# Patient Record
Sex: MALE | Race: WHITE | NOT HISPANIC OR LATINO | Employment: UNEMPLOYED | URBAN - METROPOLITAN AREA
[De-identification: names, ages, dates, MRNs, and addresses within clinical notes are randomized per-mention and may not be internally consistent; named-entity substitution may affect disease eponyms.]

---

## 2017-10-16 ENCOUNTER — TELEPHONE (OUTPATIENT)
Dept: ADMINISTRATIVE | Facility: HOSPITAL | Age: 52
End: 2017-10-16

## 2018-04-24 ENCOUNTER — TELEPHONE (OUTPATIENT)
Dept: ADMINISTRATIVE | Facility: HOSPITAL | Age: 53
End: 2018-04-24

## 2019-02-08 PROBLEM — K57.32 DIVERTICULITIS OF LARGE INTESTINE WITHOUT BLEEDING: Status: ACTIVE | Noted: 2019-02-08

## 2019-02-08 PROBLEM — K57.20 DIVERTICULITIS OF LARGE INTESTINE WITH PERFORATION: Status: ACTIVE | Noted: 2019-02-08

## 2019-02-08 PROBLEM — R10.9 ABDOMINAL PAIN: Status: ACTIVE | Noted: 2019-02-08

## 2019-02-14 ENCOUNTER — PATIENT OUTREACH (OUTPATIENT)
Dept: ADMINISTRATIVE | Facility: CLINIC | Age: 54
End: 2019-02-14

## 2019-02-14 NOTE — PATIENT INSTRUCTIONS
Discharge Instructions for Colostomy  You just underwent a procedure that required a colostomy. This is a life-saving procedure that involves removing or disconnecting part of your colon (large intestine). If your large intestine was diseased, your healthcare provider may have removed it. If it was injured, your healthcare provider may have disconnected it for a short time so that it can heal. After it heals, your healthcare provider may reconnect it. During a colostomy formation, your healthcare provider reroutes your colon through your abdominal wall. Stool and mucus can then pass out of your body through this opening, called a stoma. The following are general guidelines for home care following a colostomy. Your healthcare provider will go over any information that is specific to your condition.  Home care  Suggestions for home care include the following:  · Take care of your stoma as directed. Your healthcare provider and ostomy nurse discussed how to do this with you before you left the hospital.  · Ask your healthcare provider or ostomy nurse for a patient education sheet about colostomy care before you leave the hospital. This will help remind you how to care for yourself.  · If a partner or significant other will be helping you recover, ask the medical team to educate that person on ostomy care as well.   · Dont lift anything heavier than 5 pounds until your healthcare provider says it is OK.  · Dont drive until after your first healthcare providers appointment after your surgery.  · If you ride in a car for more than short trips, stop often to stretch your legs.  · Ask your healthcare provider when you can expect to return to work. Most patients are able to return to work within 4 to 6 weeks after surgery.  · Increase your activity gradually. Take short walks on a level surface.  · Wash your incision site with soap and water and pat it dry.  · Check your incision every day for redness, drainage, swelling,  or separation of the skin.  · Take your medicines exactly as directed. Dont skip doses.  · Dont take any over-the-counter medicine unless your healthcare provider tells you to do so.  Call your healthcare provider  Call your healthcare provider immediately if you have any of the following:  · Excessive bleeding from your stoma  · Blood in your stool  · Stool that is very hard  · No gas or stool  · Change in the color of your stoma  · Bulging skin around your stoma  · A stoma that looks like its getting longer  · Fever of 100.4°F (38°C) or higher, or chills, or as advised by your healthcare provider   · Redness, swelling, bleeding, or drainage from your incision  · Constipation  · Diarrhea  · Nausea or vomiting  · Increased pain in the belly or around the stoma   Date Last Reviewed: 7/1/2016 © 2000-2019[employer]. [address] [place] [address]. All rights reserved. This information is not intended as a substitute for professional medical care. Always follow your healthcare professional's instructions.  Bacteremia, Suspected (Adult)  Your fever today is probably due to a viral illness. However, sometimes fever can be an early sign of a more serious bacterial infection. Bacteremia is a bacterial infection that has spread to the bloodstream. This is serious because it can spread to other organs, including the kidneys, brain, and lungs.  Your healthcare provider will perform tests (cultures) to check for bacteremia. Until the test results are known you should watch for the signs listed below.  Causes  Bacteremia usually starts with a typical infection, but it then spreads to the blood. Almost any type of infection can cause bacteremia, including a urinary tract infection, skin infection, gastrointestinal problem, surgical complication, or pneumonia.  Symptoms  At first symptoms may seem like any typical infection or illness, but then they worsen. Symptoms of bacteremia can include:  · Fever  and chills  · Loss of appetite  · Nausea or vomiting  · Trouble breathing or fast breathing  · Fast heart rate  · Feeling lightheaded or faint  · Skin rashes or blotches  Home care  Follow these guidelines when caring for yourself at home.  · Rest at home for the first 2 to 3 days. When resuming activity, don't let yourself become overly tired.  · You can take acetaminophen or ibuprofen for pain, unless you were given a different pain medicine to use. (Note: If you have chronic liver or kidney disease or have ever had a stomach ulcer or gastrointestinal bleeding, talk with your healthcare provider before using these medicines. Also talk to your provider if you are taking medicine to prevent blood clots.) Aspirin should never be given to anyone younger than 18 years of age who is ill with a viral infection or fever. It may cause severe liver or brain damage.  · If you were given antibiotics, take them until they are used up, or your healthcare provider tells you to stop. It is important to finish the antibiotics even though you feel better. This is to make sure the infection has cleared.  · Your appetite may be poor, so a light diet is fine. Avoid dehydration by drinking 6 to 8 glasses of fluid per day (such as water, soft drinks, sports drinks, juices, tea, or soup).  Follow-up care  Follow up with your healthcare provider, or as advised.  · If a culture was done, you will be notified if your treatment needs to be changed. You can call as directed for the results.  · If X-rays, a CT, or an ultrasound were done, a specialist will review them. You will be notified of any findings that may affect your care.  Call 911  Contact emergency services right away if any of these occur:  · Trouble breathing or swallowing, or wheezing  · Chest pain  · Confusion or sudden change in behavior  · Extreme drowsiness or trouble awakening  · Fainting or loss of consciousness  · Rapid heart rate  · Low blood pressure  · Vomiting blood,  or large amounts of blood in stool  · Seizure  When to seek medical advice  Call your healthcare provider right away if any of these occur:  · Cough with lots of colored sputum (mucus), or blood in your sputum  · Severe headache  · Severe face, neck, throat, or ear pain  · Pain in the right lower abdomen  · Weakness, dizziness, repeated vomiting, or diarrhea  · Joint pain or a new rash  · Burning when urinating  · Fever of 100.4°F (38°C) or higher  Date Last Reviewed: 7/30/2015  © 4455-8832 [employer]. [address] [place] [address]. All rights reserved. This information is not intended as a substitute for professional medical care. Always follow your healthcare professional's instructions.

## 2019-02-18 ENCOUNTER — NURSE TRIAGE (OUTPATIENT)
Dept: ADMINISTRATIVE | Facility: CLINIC | Age: 54
End: 2019-02-18

## 2019-02-18 NOTE — TELEPHONE ENCOUNTER
Reason for Disposition   [1] Pus or bad-smelling fluid draining from incision AND [2] no fever    Protocols used: ST POST-OP INCISION SYMPTOMS-A-AH    Cristóbal called to say the stoma created w/ exp lap 02/08/19 is leaking pus, thick yellow, blood-tinged mucus, which is new. He began noticing some blood from inside the stoma when bag changed on Saturday, but today the infectious-looking drainage is new.  No fever. Some redness around the stoma. No new or severe abdominal pain.  Per [facility] triage protocol, recommended he be seen within 4 hours, and assured him will message Dr Bogran-Reyes with this information and request a call back as soon as possible.  He states his wife will bring him to [facility] now, and wait for the call from Dr Reyes. Message also to Shelli Price MD, and aquiles Perry. Please contact caller directly with any additional care advice at [phone], as they will have this phone with them.

## 2019-02-21 PROBLEM — Z93.3 COLOSTOMY PRESENT: Status: ACTIVE | Noted: 2019-02-21

## 2019-03-25 PROBLEM — K57.92 DIVERTICULITIS: Status: ACTIVE | Noted: 2019-03-25

## 2019-04-10 PROBLEM — Z93.3 STATUS POST HARTMANN PROCEDURE: Status: ACTIVE | Noted: 2019-04-10

## 2019-04-30 PROBLEM — K57.32 DIVERTICULITIS OF LARGE INTESTINE WITHOUT BLEEDING: Status: RESOLVED | Noted: 2019-02-08 | Resolved: 2019-04-30

## 2019-04-30 PROBLEM — R10.9 ABDOMINAL PAIN: Status: RESOLVED | Noted: 2019-02-08 | Resolved: 2019-04-30

## 2019-04-30 PROBLEM — Z93.3 STATUS POST HARTMANN'S PROCEDURE: Status: ACTIVE | Noted: 2019-04-30

## 2019-04-30 PROBLEM — K57.92 DIVERTICULITIS: Status: RESOLVED | Noted: 2019-03-25 | Resolved: 2019-04-30

## 2019-04-30 PROBLEM — Z93.3 COLOSTOMY IN PLACE: Status: ACTIVE | Noted: 2019-04-30

## 2019-04-30 PROBLEM — K57.20 DIVERTICULITIS OF LARGE INTESTINE WITH PERFORATION: Status: RESOLVED | Noted: 2019-02-08 | Resolved: 2019-04-30

## 2019-05-01 PROBLEM — Z98.890 HISTORY OF COLOSTOMY REVERSAL: Status: ACTIVE | Noted: 2019-04-30

## 2019-05-13 ENCOUNTER — CLINICAL SUPPORT (OUTPATIENT)
Dept: SMOKING CESSATION | Facility: CLINIC | Age: 54
End: 2019-05-13
Payer: COMMERCIAL

## 2019-05-13 DIAGNOSIS — F17.210 HEAVY CIGARETTE SMOKER (20-39 PER DAY): Primary | ICD-10-CM

## 2019-05-13 PROCEDURE — 99404 PR PREVENT COUNSEL,INDIV,60 MIN: ICD-10-PCS | Mod: S$GLB,,,

## 2019-05-13 PROCEDURE — 99404 PREV MED CNSL INDIV APPRX 60: CPT | Mod: S$GLB,,,

## 2019-05-13 RX ORDER — IBUPROFEN 200 MG
1 TABLET ORAL DAILY
Qty: 14 PATCH | Refills: 0 | Status: SHIPPED | OUTPATIENT
Start: 2019-05-13 | End: 2019-05-23 | Stop reason: SDUPTHER

## 2019-05-13 NOTE — PROGRESS NOTES
Patient currently tobacco free since 4/30/19, smoking 1-1.5 packs per day prior.  Pt will begin weekly tobacco cessation group meetings. Patient will continue previously prescribed tobacco cessation medication regimen of 21mg nicotine patch, increasing from 14mg, daily as directed.

## 2019-05-16 ENCOUNTER — CLINICAL SUPPORT (OUTPATIENT)
Dept: SMOKING CESSATION | Facility: CLINIC | Age: 54
End: 2019-05-16
Payer: COMMERCIAL

## 2019-05-16 DIAGNOSIS — F17.200 NICOTINE DEPENDENCE: Primary | ICD-10-CM

## 2019-05-16 PROCEDURE — 90853 GROUP PSYCHOTHERAPY: CPT | Mod: S$GLB,,,

## 2019-05-16 PROCEDURE — 90853 PR GROUP PSYCHOTHERAPY: ICD-10-PCS | Mod: S$GLB,,,

## 2019-05-16 NOTE — PROGRESS NOTES
Smoking Cessation Group Session #2    Site: [facility]  Date:  5/16/2019  Clinical Status of Patient: Outpatient   Length of Service and Code: 90 minutes - 55473   Number in Attendance: 5  Group Activities/Focus of Group:  completion of TCRS (Tobacco Cessation Rating Scale) reviewed strategies, cues, and triggers. Introduced the negative impact of tobacco on health, the health advantages of discontinuing the use of tobacco, time line improved health changes after a quit, withdrawal issues to expect from nicotine and habit, and ways to achieve the goal of a quit.    Target symptoms:  withdrawal and medication side effects             The following were rated moderate (3) to severe (4) on TCRS:       Moderate 3: none     Severe 4:   none  Patient's Response to Intervention: 0ppm; .5 cig on only 1 day; Patient remains on prescribed tobacco cessation medication regimen of 21 mg patch without any negative side effects at this time; desire to quit = 8, confidence = 8      Progress Toward Goals and Other Mental Status Changes: The patient denies any abnormal behavioral or mental changes at this time.     Diagnosis: F17.210    Plan: The patient will continue with group therapy sessions and medication monitoring by CTTS. Prescribed medication management will be by physician.   Return to Clinic: 1 week    Quit Date:    Planned Quit Date:

## 2019-05-16 NOTE — Clinical Note
0ppm; .5 cig on only 1 day; Patient remains on prescribed tobacco cessation medication regimen of 21 mg patch without any negative side effects at this time; desire to quit = 8, confidence = 8

## 2019-05-23 ENCOUNTER — CLINICAL SUPPORT (OUTPATIENT)
Dept: SMOKING CESSATION | Facility: CLINIC | Age: 54
End: 2019-05-23
Payer: COMMERCIAL

## 2019-05-23 DIAGNOSIS — F17.210 HEAVY CIGARETTE SMOKER (20-39 PER DAY): ICD-10-CM

## 2019-05-23 DIAGNOSIS — F17.200 NICOTINE DEPENDENCE: Primary | ICD-10-CM

## 2019-05-23 PROCEDURE — 90853 GROUP PSYCHOTHERAPY: CPT | Mod: S$GLB,,,

## 2019-05-23 PROCEDURE — 90853 PR GROUP PSYCHOTHERAPY: ICD-10-PCS | Mod: S$GLB,,,

## 2019-05-23 RX ORDER — IBUPROFEN 200 MG
1 TABLET ORAL DAILY
Qty: 14 PATCH | Refills: 0 | Status: SHIPPED | OUTPATIENT
Start: 2019-05-23 | End: 2019-06-06 | Stop reason: DRUGHIGH

## 2019-05-23 NOTE — Clinical Note
0ppm; 0 cig/day since 5/16/19; Patient remains on prescribed tobacco cessation medication regimen of 21 mg patch without any negative side effects at this time; desire to quit = 10, confidence = 8

## 2019-05-23 NOTE — PROGRESS NOTES
Smoking Cessation Group Session #3    Site: [facility]  Date:  5/23/2019  Clinical Status of Patient: Outpatient   Length of Service and Code: 90 minutes - 13690   Number in Attendance: 4  Group Activities/Focus of Group:  completion of TCRS (Tobacco Cessation Rating Scale) reviewed strategies, controlling environment, cues, triggers, new goals set. Introduced high risk situations with preparation interventions, caffeine similarities with withdrawal issues of habit and nicotine, Alcohol, Understanding urges, cravings, stress and relaxation. Open discussion with intervention discussion.    Target symptoms:  withdrawal and medication side effects             The following were rated moderate (3) to severe (4) on TCRS:       Moderate 3: none     Severe 4:   none  Patient's Response to Intervention: 0ppm; 0 cig/day since 5/16/19; Patient remains on prescribed tobacco cessation medication regimen of 21 mg patch without any negative side effects at this time; desire to quit = 10, confidence = 8      Progress Toward Goals and Other Mental Status Changes: The patient denies any abnormal behavioral or mental changes at this time.     Diagnosis: F17.210    Plan: The patient will continue with group therapy sessions and medication monitoring by CTTS. Prescribed medication management will be by physician.   Return to Clinic: 1 week    Quit Date: 5/16/19   Planned Quit Date: 5/16/19

## 2019-05-30 ENCOUNTER — CLINICAL SUPPORT (OUTPATIENT)
Dept: SMOKING CESSATION | Facility: CLINIC | Age: 54
End: 2019-05-30
Payer: COMMERCIAL

## 2019-05-30 DIAGNOSIS — F17.200 NICOTINE DEPENDENCE: Primary | ICD-10-CM

## 2019-05-30 PROCEDURE — 90853 PR GROUP PSYCHOTHERAPY: ICD-10-PCS | Mod: S$GLB,,,

## 2019-05-30 PROCEDURE — 90853 GROUP PSYCHOTHERAPY: CPT | Mod: S$GLB,,,

## 2019-05-30 NOTE — Clinical Note
2ppm; 0 cig/day since 5/16/19; Patient remains on prescribed tobacco cessation medication regimen of 21 mg patch without any negative side effects at this time; desire to quit = 9, confidence = 9

## 2019-05-30 NOTE — PROGRESS NOTES
Smoking Cessation Group Session #4    Site: [facility]  Date:  5/30/2019  Clinical Status of Patient: Outpatient   Length of Service and Code: 90 minutes - 42714   Number in Attendance: 5  Group Activities/Focus of Group:  completion of TCRS (Tobacco Cessation Rating Scale) reviewed strategies, habitual behavior, stress, and high risk situations. Introduced stress with addition interventions, SOLVE, relaxation with interventions, nutrition, exercise, weight gain, and the importance of rewarding oneself for accomplishments toward becoming tobacco free. Open discussion of all items with interventions.     Target symptoms:  withdrawal and medication side effects             The following were rated moderate (3) to severe (4) on TCRS:       Moderate 3: none     Severe 4:   none  Patient's Response to Intervention: 2ppm; 0 cig/day since 5/16/19; Patient remains on prescribed tobacco cessation medication regimen of 21 mg patch without any negative side effects at this time; desire to quit = 9, confidence = 9      Progress Toward Goals and Other Mental Status Changes: The patient denies any abnormal behavioral or mental changes at this time.     Diagnosis: F17.210  Plan: The patient will continue with group therapy sessions and medication monitoring by CTTS. Prescribed medication management will be by physician.   Return to Clinic: 1 week    Quit Date: 5/16/19   Planned Quit Date: 5/16/19

## 2019-06-03 PROBLEM — S30.1XXA ABDOMINAL WALL SEROMA: Status: ACTIVE | Noted: 2019-06-03

## 2019-06-06 ENCOUNTER — CLINICAL SUPPORT (OUTPATIENT)
Dept: SMOKING CESSATION | Facility: CLINIC | Age: 54
End: 2019-06-06
Payer: COMMERCIAL

## 2019-06-06 DIAGNOSIS — F17.200 NICOTINE DEPENDENCE: Primary | ICD-10-CM

## 2019-06-06 PROCEDURE — 99406 PR TOBACCO USE CESSATION INTERMEDIATE 3-10 MINUTES: ICD-10-PCS | Mod: S$GLB,,,

## 2019-06-06 PROCEDURE — 99406 BEHAV CHNG SMOKING 3-10 MIN: CPT | Mod: S$GLB,,,

## 2019-06-06 RX ORDER — IBUPROFEN 200 MG
1 TABLET ORAL DAILY
Qty: 14 PATCH | Refills: 0 | Status: SHIPPED | OUTPATIENT
Start: 2019-06-06 | End: 2019-06-13 | Stop reason: SDUPTHER

## 2019-06-06 NOTE — PROGRESS NOTES
pt reports smoking 2 cigarettes this past week after learning the need for another surgery and feeling very discouraged and stressed; pt is doing well on nicotine patches and will continue 14mg nicotine patch, reducing from 21mg, without negative side effects at this time; discussed with pt upcoming surgery and next steps in quittng process

## 2019-06-13 ENCOUNTER — CLINICAL SUPPORT (OUTPATIENT)
Dept: SMOKING CESSATION | Facility: CLINIC | Age: 54
End: 2019-06-13
Payer: COMMERCIAL

## 2019-06-13 DIAGNOSIS — F17.200 NICOTINE DEPENDENCE: Primary | ICD-10-CM

## 2019-06-13 PROCEDURE — 90853 PR GROUP PSYCHOTHERAPY: ICD-10-PCS | Mod: S$GLB,,,

## 2019-06-13 PROCEDURE — 90853 GROUP PSYCHOTHERAPY: CPT | Mod: S$GLB,,,

## 2019-06-13 RX ORDER — IBUPROFEN 200 MG
1 TABLET ORAL DAILY
Qty: 14 PATCH | Refills: 0 | Status: SHIPPED | OUTPATIENT
Start: 2019-06-13 | End: 2019-06-26 | Stop reason: SDUPTHER

## 2019-06-13 NOTE — Clinical Note
0ppm; 3 cig total in past week; Patient remains on prescribed tobacco cessation medication regimen of 14mg patch without any negative side effects at this time; desire to quit = 9, confidence = 9

## 2019-06-13 NOTE — PROGRESS NOTES
Smoking Cessation Group Session #6    Site: [facility]  Date:  6/13/2019  Clinical Status of Patient: Outpatient   Length of Service and Code: 90 minutes - 66243   Number in Attendance: 3  Group Activities/Focus of Group:  completion of TCRS (Tobacco Cessation Rating Scale) reviewed strategies, cues, triggers, high risk situations, lapses, relapses, diet, exercise, stress, relaxation, sleep, habitual behavior, and life style changes.    Target symptoms:  withdrawal and medication side effects             The following were rated moderate (3) to severe (4) on TCRS:       Moderate 3: none     Severe 4:   none  Patient's Response to Intervention: 0ppm; 3 cig total in past week; Patient remains on prescribed tobacco cessation medication regimen of 14mg patch without any negative side effects at this time; desire to quit = 9, confidence = 9      Progress Toward Goals and Other Mental Status Changes: The patient denies any abnormal behavioral or mental changes at this time.     Diagnosis: F17.210    Plan: The patient will continue with group therapy sessions and medication monitoring by CTTS. Prescribed medication management will be by physician.   Return to Clinic: 2 weeks    Quit Date:    Planned Quit Date:

## 2019-06-26 ENCOUNTER — CLINICAL SUPPORT (OUTPATIENT)
Dept: SMOKING CESSATION | Facility: CLINIC | Age: 54
End: 2019-06-26
Payer: COMMERCIAL

## 2019-06-26 DIAGNOSIS — F17.200 NICOTINE DEPENDENCE: Primary | ICD-10-CM

## 2019-06-26 PROCEDURE — 99402 PREV MED CNSL INDIV APPRX 30: CPT | Mod: S$GLB,,,

## 2019-06-26 PROCEDURE — 99402 PR PREVENT COUNSEL,INDIV,30 MIN: ICD-10-PCS | Mod: S$GLB,,,

## 2019-06-26 RX ORDER — IBUPROFEN 200 MG
1 TABLET ORAL DAILY
Qty: 14 PATCH | Refills: 0 | Status: SHIPPED | OUTPATIENT
Start: 2019-06-26 | End: 2019-07-10 | Stop reason: DRUGHIGH

## 2019-06-26 NOTE — Clinical Note
1ppm; 0 cig/day since3 6/15/19; The patient remains on the prescribed tobacco cessation medication regimen of 7mg patch without any negative side effects at this time; discussed next steps in quit plan; desire to quit = 9, confidence = 9

## 2019-06-26 NOTE — PROGRESS NOTES
Individual Follow-Up Form    6/26/2019    Quit Date: 6/15/19    Clinical Status of Patient: Outpatient    Length of Service: 30 minutes    Continuing Medication: yes  Patches    Other Medications:      Target Symptoms: Withdrawal and medication side effects. The following were  rated moderate (3) to severe (4) on TCRS:  · Moderate (3): none  · Severe (4): none    Comments: 1ppm; 0 cig/day since3 6/15/19; The patient remains on the prescribed tobacco cessation medication regimen of 7mg patch without any negative side effects at this time; discussed next steps in quit plan; desire to quit = 9, confidence = 9         Diagnosis: F17.200    Next Visit: 2 weeks

## 2019-07-10 ENCOUNTER — CLINICAL SUPPORT (OUTPATIENT)
Dept: SMOKING CESSATION | Facility: CLINIC | Age: 54
End: 2019-07-10
Payer: COMMERCIAL

## 2019-07-10 DIAGNOSIS — F17.200 NICOTINE DEPENDENCE: Primary | ICD-10-CM

## 2019-07-10 PROCEDURE — 99402 PREV MED CNSL INDIV APPRX 30: CPT | Mod: S$GLB,,,

## 2019-07-10 PROCEDURE — 99402 PR PREVENT COUNSEL,INDIV,30 MIN: ICD-10-PCS | Mod: S$GLB,,,

## 2019-07-10 RX ORDER — NICOTINE 7MG/24HR
1 PATCH, TRANSDERMAL 24 HOURS TRANSDERMAL DAILY
Qty: 14 PATCH | Refills: 0 | Status: SHIPPED | OUTPATIENT
Start: 2019-07-10 | End: 2019-07-24 | Stop reason: SDUPTHER

## 2019-07-10 NOTE — Clinical Note
4ppm; 0 cig/day since 6/15/19; The patient remains on the prescribed tobacco cessation medication regimen of 7mg patch, reducing from 14mg. without any negative side effects at this time

## 2019-07-10 NOTE — PROGRESS NOTES
Individual Follow-Up Form    7/10/2019    Quit Date: 6/15/19    Clinical Status of Patient: Outpatient    Length of Service: 30 minutes    Continuing Medication: yes  Patches    Other Medications:      Target Symptoms: Withdrawal and medication side effects. The following were  rated moderate (3) to severe (4) on TCRS:  · Moderate (3): none  · Severe (4): none    Comments: 4ppm; 0 cig/day since 6/15/19; The patient remains on the prescribed tobacco cessation medication regimen of 7mg patch, reducing from 14mg. without any negative side effects at this time       Diagnosis: F17.200    Next Visit: 2 weeks

## 2019-07-24 ENCOUNTER — TELEPHONE (OUTPATIENT)
Dept: SMOKING CESSATION | Facility: CLINIC | Age: 54
End: 2019-07-24

## 2019-07-24 DIAGNOSIS — F17.200 NICOTINE DEPENDENCE: ICD-10-CM

## 2019-07-24 RX ORDER — NICOTINE 7MG/24HR
1 PATCH, TRANSDERMAL 24 HOURS TRANSDERMAL DAILY
Qty: 14 PATCH | Refills: 0 | Status: SHIPPED | OUTPATIENT
Start: 2019-07-24 | End: 2019-08-14 | Stop reason: SDUPTHER

## 2019-08-14 ENCOUNTER — CLINICAL SUPPORT (OUTPATIENT)
Dept: SMOKING CESSATION | Facility: CLINIC | Age: 54
End: 2019-08-14
Payer: COMMERCIAL

## 2019-08-14 DIAGNOSIS — F17.200 NICOTINE DEPENDENCE: Primary | ICD-10-CM

## 2019-08-14 PROCEDURE — 99402 PR PREVENT COUNSEL,INDIV,30 MIN: ICD-10-PCS | Mod: S$GLB,,,

## 2019-08-14 PROCEDURE — 99402 PREV MED CNSL INDIV APPRX 30: CPT | Mod: S$GLB,,,

## 2019-08-14 RX ORDER — NICOTINE 7MG/24HR
1 PATCH, TRANSDERMAL 24 HOURS TRANSDERMAL DAILY
Qty: 28 PATCH | Refills: 0 | Status: SHIPPED | OUTPATIENT
Start: 2019-08-14 | End: 2021-06-07

## 2019-08-14 NOTE — PROGRESS NOTES
Individual Follow-Up Form    8/14/2019    Quit Date: 6/15/19    Clinical Status of Patient: Outpatient    Length of Service: 30 minutes    Continuing Medication: yes  Patches    Other Medications:      Target Symptoms: Withdrawal and medication side effects. The following were  rated moderate (3) to severe (4) on TCRS:  · Moderate (3): none  · Severe (4): none    Comments: 0 cig/day since 6/15/19; The patient remains on the prescribed tobacco cessation medication regimen of 7mg patch, without any negative side effects at this time; discussed with pt next steps in quit plan    Diagnosis: F17.200    Next Visit: 9/11/19

## 2019-08-14 NOTE — Clinical Note
0 cig/day since 6/15/19; The patient remains on the prescribed tobacco cessation medication regimen of 7mg patch, without any negative side effects at this time; discussed with pt next steps in quit plan

## 2019-09-11 ENCOUNTER — CLINICAL SUPPORT (OUTPATIENT)
Dept: SMOKING CESSATION | Facility: CLINIC | Age: 54
End: 2019-09-11
Payer: COMMERCIAL

## 2019-09-11 DIAGNOSIS — F17.200 NICOTINE DEPENDENCE: Primary | ICD-10-CM

## 2019-09-11 PROCEDURE — 99402 PREV MED CNSL INDIV APPRX 30: CPT | Mod: S$GLB,,,

## 2019-09-11 PROCEDURE — 99402 PR PREVENT COUNSEL,INDIV,30 MIN: ICD-10-PCS | Mod: S$GLB,,,

## 2019-09-11 NOTE — PROGRESS NOTES
Individual Follow-Up Form    9/11/2019    Quit Date: 6/15/19    Clinical Status of Patient: Outpatient    Length of Service: 30 minutes    Continuing Medication: no    Other Medications:      Target Symptoms: Withdrawal and medication side effects. The following were  rated moderate (3) to severe (4) on TCRS:  · Moderate (3): none  · Severe (4): none    Comments: 0 cig/day since 6/15/19; pt discontinued nicotine patch on his own about a week ago and reports doing well, does not want to resume patches at this time; congratulated pt on quit status, discharged from clinic, encouraged to contact clinic if cravings/desire increase    Diagnosis: F17.200    Next Visit: discharged from clinic

## 2019-09-11 NOTE — Clinical Note
0 cig/day since 6/15/19; pt discontinued nicotine patch on his own about a week ago and reports doing well, does not want to resume patches at this time; congratulated pt on quit status, discharged from clinic, encouraged to contact clinic if cravings/desire increase

## 2019-11-15 ENCOUNTER — CLINICAL SUPPORT (OUTPATIENT)
Dept: SMOKING CESSATION | Facility: CLINIC | Age: 54
End: 2019-11-15
Payer: COMMERCIAL

## 2019-11-15 DIAGNOSIS — F17.200 NICOTINE DEPENDENCE: Primary | ICD-10-CM

## 2019-11-15 PROCEDURE — 99407 PR TOBACCO USE CESSATION INTENSIVE >10 MINUTES: ICD-10-PCS | Mod: S$GLB,,,

## 2019-11-15 PROCEDURE — 99407 BEHAV CHNG SMOKING > 10 MIN: CPT | Mod: S$GLB,,,

## 2019-11-15 NOTE — PROGRESS NOTES
Successful contact with patient regarding tobacco cessation quit #1. Pt states, he remain tobacco free, he no longer experience cravings, and no further assistance is needed at this time. Pt commended for the accomplishment thus far. Pt informed of his benefit status, future telephone follow ups, and contact information to schedule an appointment if he need support. Will update the tobacco cessation smart form for 3-6 months on quit #1.

## 2020-05-13 ENCOUNTER — CLINICAL SUPPORT (OUTPATIENT)
Dept: SMOKING CESSATION | Facility: CLINIC | Age: 55
End: 2020-05-13
Payer: COMMERCIAL

## 2020-05-13 DIAGNOSIS — F17.200 NICOTINE DEPENDENCE: Primary | ICD-10-CM

## 2020-05-13 PROCEDURE — 99407 BEHAV CHNG SMOKING > 10 MIN: CPT | Mod: S$GLB,,,

## 2020-05-13 PROCEDURE — 99407 PR TOBACCO USE CESSATION INTENSIVE >10 MINUTES: ICD-10-PCS | Mod: S$GLB,,,

## 2020-05-13 NOTE — PROGRESS NOTES
Called pt to f/u on his 12 month smoking cessation quit status. Pt stated he remains tobacco free. Congratulated him on his hard work and success. Informed him of benefit period, phone follow ups, and contact information. Will complete smart form and resolve quit #1 episode.

## 2021-05-04 ENCOUNTER — PATIENT MESSAGE (OUTPATIENT)
Dept: RESEARCH | Facility: HOSPITAL | Age: 56
End: 2021-05-04

## 2021-06-24 PROBLEM — R09.02 HYPOXIA: Status: ACTIVE | Noted: 2021-06-24

## 2021-06-24 PROBLEM — S46.012A TRAUMATIC COMPLETE TEAR OF LEFT ROTATOR CUFF: Status: ACTIVE | Noted: 2021-06-24

## 2021-08-26 ENCOUNTER — LAB VISIT (OUTPATIENT)
Dept: PRIMARY CARE CLINIC | Facility: OTHER | Age: 56
End: 2021-08-26
Attending: INTERNAL MEDICINE
Payer: MEDICAID

## 2021-08-26 DIAGNOSIS — U07.1 COVID-19: Primary | ICD-10-CM

## 2021-08-26 PROCEDURE — U0003 INFECTIOUS AGENT DETECTION BY NUCLEIC ACID (DNA OR RNA); SEVERE ACUTE RESPIRATORY SYNDROME CORONAVIRUS 2 (SARS-COV-2) (CORONAVIRUS DISEASE [COVID-19]), AMPLIFIED PROBE TECHNIQUE, MAKING USE OF HIGH THROUGHPUT TECHNOLOGIES AS DESCRIBED BY CMS-2020-01-R: HCPCS | Performed by: INTERNAL MEDICINE

## 2021-08-28 LAB
SARS-COV-2 RNA RESP QL NAA+PROBE: NOT DETECTED
SARS-COV-2- CYCLE NUMBER: NORMAL

## 2021-09-08 ENCOUNTER — CLINICAL SUPPORT (OUTPATIENT)
Dept: REHABILITATION | Facility: HOSPITAL | Age: 56
End: 2021-09-08
Payer: MEDICAID

## 2021-09-08 DIAGNOSIS — S46.012A TRAUMATIC COMPLETE TEAR OF LEFT ROTATOR CUFF, INITIAL ENCOUNTER: Primary | ICD-10-CM

## 2021-09-08 PROCEDURE — 97165 OT EVAL LOW COMPLEX 30 MIN: CPT

## 2021-09-14 ENCOUNTER — TELEPHONE (OUTPATIENT)
Dept: REHABILITATION | Facility: HOSPITAL | Age: 56
End: 2021-09-14

## 2021-09-22 ENCOUNTER — CLINICAL SUPPORT (OUTPATIENT)
Dept: REHABILITATION | Facility: HOSPITAL | Age: 56
End: 2021-09-22
Payer: MEDICAID

## 2021-09-22 DIAGNOSIS — S46.012A TRAUMATIC COMPLETE TEAR OF LEFT ROTATOR CUFF, INITIAL ENCOUNTER: Primary | ICD-10-CM

## 2021-09-22 PROCEDURE — 97530 THERAPEUTIC ACTIVITIES: CPT

## 2021-09-24 ENCOUNTER — CLINICAL SUPPORT (OUTPATIENT)
Dept: REHABILITATION | Facility: HOSPITAL | Age: 56
End: 2021-09-24
Payer: MEDICAID

## 2021-09-24 DIAGNOSIS — S46.012A TRAUMATIC COMPLETE TEAR OF LEFT ROTATOR CUFF, INITIAL ENCOUNTER: Primary | ICD-10-CM

## 2021-09-24 PROCEDURE — 97530 THERAPEUTIC ACTIVITIES: CPT

## 2021-10-08 ENCOUNTER — CLINICAL SUPPORT (OUTPATIENT)
Dept: REHABILITATION | Facility: HOSPITAL | Age: 56
End: 2021-10-08
Payer: MEDICAID

## 2021-10-08 DIAGNOSIS — S46.012A TRAUMATIC COMPLETE TEAR OF LEFT ROTATOR CUFF, INITIAL ENCOUNTER: Primary | ICD-10-CM

## 2021-10-08 PROCEDURE — 97530 THERAPEUTIC ACTIVITIES: CPT

## 2021-11-05 ENCOUNTER — DOCUMENTATION ONLY (OUTPATIENT)
Dept: REHABILITATION | Facility: HOSPITAL | Age: 56
End: 2021-11-05
Payer: MEDICAID

## 2021-11-18 ENCOUNTER — PATIENT MESSAGE (OUTPATIENT)
Dept: ADMINISTRATIVE | Facility: OTHER | Age: 56
End: 2021-11-18
Payer: MEDICAID

## 2022-01-08 ENCOUNTER — OFFICE VISIT (OUTPATIENT)
Dept: URGENT CARE | Facility: CLINIC | Age: 57
End: 2022-01-08
Payer: MEDICAID

## 2022-01-08 VITALS
HEART RATE: 87 BPM | TEMPERATURE: 98 F | BODY MASS INDEX: 31.39 KG/M2 | DIASTOLIC BLOOD PRESSURE: 84 MMHG | RESPIRATION RATE: 16 BRPM | SYSTOLIC BLOOD PRESSURE: 132 MMHG | HEIGHT: 71 IN | WEIGHT: 224.19 LBS | OXYGEN SATURATION: 95 %

## 2022-01-08 DIAGNOSIS — R05.9 COUGH: ICD-10-CM

## 2022-01-08 DIAGNOSIS — Z11.59 SCREENING FOR VIRAL DISEASE: ICD-10-CM

## 2022-01-08 DIAGNOSIS — Z76.0 MEDICATION REFILL: ICD-10-CM

## 2022-01-08 DIAGNOSIS — J06.9 ACUTE URI: Primary | ICD-10-CM

## 2022-01-08 LAB
CTP QC/QA: YES
SARS-COV-2 RDRP RESP QL NAA+PROBE: NEGATIVE

## 2022-01-08 PROCEDURE — 1159F MED LIST DOCD IN RCRD: CPT | Mod: CPTII,S$GLB,, | Performed by: NURSE PRACTITIONER

## 2022-01-08 PROCEDURE — 3008F BODY MASS INDEX DOCD: CPT | Mod: CPTII,S$GLB,, | Performed by: NURSE PRACTITIONER

## 2022-01-08 PROCEDURE — 1159F PR MEDICATION LIST DOCUMENTED IN MEDICAL RECORD: ICD-10-PCS | Mod: CPTII,S$GLB,, | Performed by: NURSE PRACTITIONER

## 2022-01-08 PROCEDURE — 1160F RVW MEDS BY RX/DR IN RCRD: CPT | Mod: CPTII,S$GLB,, | Performed by: NURSE PRACTITIONER

## 2022-01-08 PROCEDURE — 3075F PR MOST RECENT SYSTOLIC BLOOD PRESS GE 130-139MM HG: ICD-10-PCS | Mod: CPTII,S$GLB,, | Performed by: NURSE PRACTITIONER

## 2022-01-08 PROCEDURE — U0002: ICD-10-PCS | Mod: QW,S$GLB,, | Performed by: NURSE PRACTITIONER

## 2022-01-08 PROCEDURE — U0002 COVID-19 LAB TEST NON-CDC: HCPCS | Mod: QW,S$GLB,, | Performed by: NURSE PRACTITIONER

## 2022-01-08 PROCEDURE — 99213 PR OFFICE/OUTPT VISIT, EST, LEVL III, 20-29 MIN: ICD-10-PCS | Mod: S$GLB,,, | Performed by: NURSE PRACTITIONER

## 2022-01-08 PROCEDURE — 3075F SYST BP GE 130 - 139MM HG: CPT | Mod: CPTII,S$GLB,, | Performed by: NURSE PRACTITIONER

## 2022-01-08 PROCEDURE — 1160F PR REVIEW ALL MEDS BY PRESCRIBER/CLIN PHARMACIST DOCUMENTED: ICD-10-PCS | Mod: CPTII,S$GLB,, | Performed by: NURSE PRACTITIONER

## 2022-01-08 PROCEDURE — 99213 OFFICE O/P EST LOW 20 MIN: CPT | Mod: S$GLB,,, | Performed by: NURSE PRACTITIONER

## 2022-01-08 PROCEDURE — 3079F DIAST BP 80-89 MM HG: CPT | Mod: CPTII,S$GLB,, | Performed by: NURSE PRACTITIONER

## 2022-01-08 PROCEDURE — 3072F LOW RISK FOR RETINOPATHY: CPT | Mod: CPTII,S$GLB,, | Performed by: NURSE PRACTITIONER

## 2022-01-08 PROCEDURE — 3072F PR LOW RISK FOR RETINOPATHY: ICD-10-PCS | Mod: CPTII,S$GLB,, | Performed by: NURSE PRACTITIONER

## 2022-01-08 PROCEDURE — 3079F PR MOST RECENT DIASTOLIC BLOOD PRESSURE 80-89 MM HG: ICD-10-PCS | Mod: CPTII,S$GLB,, | Performed by: NURSE PRACTITIONER

## 2022-01-08 PROCEDURE — 3008F PR BODY MASS INDEX (BMI) DOCUMENTED: ICD-10-PCS | Mod: CPTII,S$GLB,, | Performed by: NURSE PRACTITIONER

## 2022-01-08 RX ORDER — AZITHROMYCIN 250 MG/1
TABLET, FILM COATED ORAL
Qty: 6 TABLET | Refills: 0 | Status: SHIPPED | OUTPATIENT
Start: 2022-01-08 | End: 2022-02-03

## 2022-01-08 RX ORDER — PANTOPRAZOLE SODIUM 40 MG/1
40 TABLET, DELAYED RELEASE ORAL DAILY
Qty: 30 TABLET | Refills: 0 | Status: SHIPPED | OUTPATIENT
Start: 2022-01-08 | End: 2023-01-31 | Stop reason: SDUPTHER

## 2022-01-08 RX ORDER — BROMPHENIRAMINE MALEATE, PSEUDOEPHEDRINE HYDROCHLORIDE, AND DEXTROMETHORPHAN HYDROBROMIDE 2; 30; 10 MG/5ML; MG/5ML; MG/5ML
5 SYRUP ORAL EVERY 6 HOURS PRN
Qty: 120 ML | Refills: 0 | Status: SHIPPED | OUTPATIENT
Start: 2022-01-08 | End: 2022-01-11

## 2022-01-08 RX ORDER — PREDNISONE 20 MG/1
10 TABLET ORAL DAILY
Qty: 3 TABLET | Refills: 0 | Status: SHIPPED | OUTPATIENT
Start: 2022-01-08 | End: 2022-01-13

## 2022-01-08 NOTE — PROGRESS NOTES
"Subjective:       Patient ID: Cristóbal Mackey is a 56 y.o. male.    Vitals:  height is 5' 11" (1.803 m) and weight is 101.7 kg (224 lb 3.3 oz). His tympanic temperature is 98.2 °F (36.8 °C). His blood pressure is 132/84 and his pulse is 87. His respiration is 16 and oxygen saturation is 95%.     Chief Complaint: Sinus Problem    56 year old male reports cough, congestion, sinus pressure sore throat since approximately last week. Pt reports his family member recently had a cold as well, tested negative for flu and covid. He also request refill of his protonix.     Sinus Problem  This is a new problem. Episode onset: 12-. The problem has been gradually worsening since onset. There has been no fever. His pain is at a severity of 0/10. He is experiencing no pain. Associated symptoms include chills, congestion, coughing, ear pain, headaches, a hoarse voice, sinus pressure, sneezing and a sore throat. Pertinent negatives include no diaphoresis, neck pain, shortness of breath or swollen glands. Past treatments include oral decongestants. The treatment provided mild relief.       Constitution: Positive for chills. Negative for sweating.   HENT: Positive for ear pain, congestion, sinus pressure and sore throat.    Neck: neck negative. Negative for neck pain.   Cardiovascular: Negative.    Respiratory: Positive for cough. Negative for sputum production and shortness of breath.    Allergic/Immunologic: Positive for sneezing.   Neurological: Positive for headaches.       Objective:      Physical Exam   Constitutional: He is oriented to person, place, and time. He appears well-developed and well-nourished. He is cooperative.  Non-toxic appearance. He does not have a sickly appearance. He does not appear ill. No distress.   HENT:   Head: Normocephalic and atraumatic.   Ears:   Right Ear: Hearing, tympanic membrane, external ear and ear canal normal.   Left Ear: Hearing, tympanic membrane, external ear and ear canal " normal.   Nose: Congestion present. No mucosal edema, rhinorrhea or nasal deformity. No epistaxis. Right sinus exhibits no maxillary sinus tenderness and no frontal sinus tenderness. Left sinus exhibits no maxillary sinus tenderness and no frontal sinus tenderness.   Mouth/Throat: Uvula is midline and mucous membranes are normal. No trismus in the jaw. Normal dentition. No uvula swelling. Posterior oropharyngeal erythema present. No oropharyngeal exudate or posterior oropharyngeal edema.   Eyes: Conjunctivae and lids are normal. No scleral icterus.   Neck: Trachea normal and phonation normal. Neck supple. No edema present. No erythema present. No neck rigidity present.   Cardiovascular: Normal rate, regular rhythm, normal heart sounds, intact distal pulses and normal pulses.   Pulmonary/Chest: Effort normal and breath sounds normal. No stridor. No respiratory distress. He has no decreased breath sounds. He has no wheezes. He has no rhonchi. He has no rales. He exhibits no tenderness.   Abdominal: Normal appearance.   Musculoskeletal: Normal range of motion.         General: No deformity or edema. Normal range of motion.      Cervical back: He exhibits no tenderness.   Lymphadenopathy:     He has no cervical adenopathy.   Neurological: He is alert and oriented to person, place, and time. He exhibits normal muscle tone. Coordination normal.   Skin: Skin is warm, dry, intact, not diaphoretic and not pale.   Psychiatric: He has a normal mood and affect. His speech is normal and behavior is normal. Judgment and thought content normal. Cognition and memory  Nursing note and vitals reviewed.        Assessment:       1. Acute URI    2. Cough    3. Medication refill    4. Screening for viral disease          Plan:         Acute URI  -     azithromycin (ZITHROMAX) 250 MG tablet; Take 2 tablets (500 mg) on  Day 1,  followed by 1 tablet (250 mg) once daily on Days 2 through 5.  Dispense: 6 tablet; Refill: 0  -     predniSONE  (DELTASONE) 20 MG tablet; Take 0.5 tablets (10 mg total) by mouth once daily. for 5 days  Dispense: 3 tablet; Refill: 0    Cough  -     brompheniramine-pseudoeph-DM (BROMFED DM) 2-30-10 mg/5 mL Syrp; Take 5 mLs by mouth every 6 (six) hours as needed (cough).  Dispense: 120 mL; Refill: 0    Medication refill  -     pantoprazole (PROTONIX) 40 MG tablet; Take 1 tablet (40 mg total) by mouth once daily.  Dispense: 30 tablet; Refill: 0    Screening for viral disease  -     POCT COVID-19 Rapid Screening          Results for orders placed or performed in visit on 01/08/22   POCT COVID-19 Rapid Screening   Result Value Ref Range    POC Rapid COVID Negative Negative     Acceptable Yes      Patient Instructions   The following are suggestions to help you with upper respiratory symptoms.    Congestion:    Nasal Saline  Nasal saline is available over the counter. There are several different commercial preparations such as Ocean spray and Ayr spray. There is no limit on the use of Nasal saline. Saline is used by snorting the mist up into the nose then later gently blowing the nose to get rid of any secretions that it has loosened.     Mucous Thinners and Decongestants  Mucous thinners and decongestants are used to shrink down the tissues and promote sinus drainage. There are multiple prescription and over-the-counter medications available. A mucous thinner will tend to be drying unless you are also drinking plenty of water when taking these. If you have high blood pressure, it is very important to monitor your pressure while on decongestants. The mucous thinner/decongestant combinations are typically given twice per day. However, some people will be unable to tolerate these at night and should only take them once per day.    Decongestant Nasal Sprays  Over-the-counter decongestant nasal spray such as Afrin, may be helpful as an initial step in treating upper respiratory infections. This spray can be used for up  to approximately 3 days and is used no more than twice per day. Topical nasal decongestant spray for longer than 5 days will result in a physical addiction, in which the nasal lining will become significantly swollen and irritated until the spray is used again. May cause elevated blood pressure    Use pseudoephedrine (behind the counter) for sinus pressure and congestion- Pseudoephedrine 30 mg up to 240 mg /day. Warning:  It can raise your blood pressure and give you palpitations, avoid with history of high blood pressure, palpitations, and severe cardiac disease.  Coricidin HBP is okay to use if you have high blood pressure.     Nasal Steroids  Nasal steroid medications such as Flonase are useful for upper respiratory infections, allergies, and sensitivities to airborne irritants. Unfortunately, they do not begin to work for 1-2 days, and they do not reach their maximum benefit for approximately 2-3 weeks. Initial therapy is typically 2 puffs per nostril twice per day. This should be used for only a few days, then the maintenance dosage is one or two puffs per nostril once per day. This can be done at any time of the day. The most effective way to use any nasal medication is to look down at your toes when spraying it in. Aim slightly away from the septum (dividing plate between the nostrils), and gently inhale. This ensures that the spray will go into the sinus cavities and not straight up into the nose. A good way to avoid spraying onto the septum is to use the right hand to spray into the left nostril, and vice versa for the right nostril. Occasionally, nasal steroids can increase the risk of nosebleeds, but in general they are very well tolerated. If you develop a bloody nose, stop using the medication immediately.    Clear Runny Nose/Allergic Rhinitis:  Use an antihistamine to help dry you out.   Antihistamines  Antihistamines are available both over-the-counter and as a prescription. There are also various  decongestant and antihistamine combinations available such as Claritin, Allegra, and Zyrtec. It is best to take any antihistamine-decongestant combination in the morning to avoid insomnia. Zyrtec should probably be taken at night, in order to reduce the chance of sleepiness during the daytime. If there is a significant infection present and secretions are already thickened, it is recommended to discontinue antihistamines and use a mucous thinner/decongestant combination.      Oral Steroids  Oral steroids can be used with more sever infections. Often, they are the only medications that will reduce the symptoms of pressure and allow the nasal sinuses to drain. These are best taken on a full stomach and earlier in the day is better. They may give you some irritability, stomach upset, or hyperactivity. This can also interfere with sleep.     A person who has high blood pressure or diabetes should be very careful to monitor their blood pressure or blood glucose while taking steroids. Steroids can have multiple side effects especially when taken long-term. Short-term doses are usually very well tolerated and extremely effective in controlling the symptoms associated with acute and chronic sinus infections and severe allergies. The use of steroids for greater than approximately seven days requires a tapering down in order to discontinue them. You should not abruptly stop your steroid if you have been taking the same dose for greater than one week.    Antibiotic Treatment  Finally, when all of these other measures have failed, and a bacterial infection is present, an antibiotic will be prescribed. The most common symptoms of acute sinusitis of a bacterial nature are pain, pressure, and thick and colored nasal drainage. However, not all colored drainage means that there is a bacterial infection present. According to the Center for Disease Control, only 2% of colds will progress to result in bacterial sinusitis. Most upper  respiratory infections should NOT be treated with antibiotics. Antibiotics should be reserved for upper respiratory infections which last longer than 10 days, or which worsen after 5 to 7 days of treatment. The use of antibiotics for nonbacterial upper respiratory infections has resulted in a severe problem with the emergence of bacteria which are resistant to multiple forms of antibiotics, and some bacteria are currently only treatable with intravenous antibiotics.    Body Aches/Pains/Fever  For patients who are not allergic to and are not on anticoagulants, you can alternate Tylenol every 4 hours and Motrin every 6 hours for fever above 100.4F and/or pain.  For patients who are allergic or intolerant to NSAIDS, have gastritis, gastric ulcers, or history of GI bleeds, are pregnant, or are on anticoagulant therapy, you can take Tylenol every 4 hours as needed for fever above 100.4F and/or pain.     Maintain adequate hydration -  Rest and keep yourself/patient well hydrated. For adults, it is recommended to drink at least 8 glasses of water daily.  This may help thin secretions and soothe the respiratory mucosa.     Sore Throat  Perform warm, salt water gargles (1/2 tsp salt to 1 cup warm water) to help reduce inflammation and throat discomfort. Chloraseptic sprays and throat lozenges will also help with your throat pain.    COUGH  A viral cough may linger for 3 to 4 weeks but should steadily improve over time. Coughing is the body's natural way to clear mucus and help get rid of bacteria and viruses. Therefore, cough suppressants are usually not recommended.      Use mucinex (guaifenisin) up to 2400mg/day to break up/loosen any mucous.     Common cough suppressants include the ingredient dextromethorphan or DM, (such as Mucinex DM) available over-the-counter and can be used for cough to stop the tickle in the back of your throat.      ? Honey may be beneficial, especially on nocturnal cough 1 to 2 teaspoons can be  taken straight or diluted in tea, juice or other liquid.    The antioxidants in honey are an important contributor to its decongestant properties. Generally speaking, darker honey contains more antioxidants. Buckwheat and avocado honey are particularly good choices. If these honeys are not available in your area, choose the darkest honey you can find.    Take all medications as directed. If you have been prescribed antibiotics, make sure to complete them.     If your condition fails to improve in a timely manner, you should receive another evaluation by your Primary Care Provider to discuss your concerns or return to urgent care for a recheck.      **You must understand that you have received Urgent Care treatment only and that you may be released before all of your medical problems are known or treated. You, the patient, are responsible to arrange for follow-up care as instructed. If your condition worsens at any time, you should report immediately to your nearest Emergency Department for further evaluation.

## 2022-01-08 NOTE — PATIENT INSTRUCTIONS
The following are suggestions to help you with upper respiratory symptoms.    Congestion:    Nasal Saline  Nasal saline is available over the counter. There are several different commercial preparations such as Ocean spray and Ayr spray. There is no limit on the use of Nasal saline. Saline is used by snorting the mist up into the nose then later gently blowing the nose to get rid of any secretions that it has loosened.     Mucous Thinners and Decongestants  Mucous thinners and decongestants are used to shrink down the tissues and promote sinus drainage. There are multiple prescription and over-the-counter medications available. A mucous thinner will tend to be drying unless you are also drinking plenty of water when taking these. If you have high blood pressure, it is very important to monitor your pressure while on decongestants. The mucous thinner/decongestant combinations are typically given twice per day. However, some people will be unable to tolerate these at night and should only take them once per day.    Decongestant Nasal Sprays  Over-the-counter decongestant nasal spray such as Afrin, may be helpful as an initial step in treating upper respiratory infections. This spray can be used for up to approximately 3 days and is used no more than twice per day. Topical nasal decongestant spray for longer than 5 days will result in a physical addiction, in which the nasal lining will become significantly swollen and irritated until the spray is used again. May cause elevated blood pressure    Use pseudoephedrine (behind the counter) for sinus pressure and congestion- Pseudoephedrine 30 mg up to 240 mg /day. Warning:  It can raise your blood pressure and give you palpitations, avoid with history of high blood pressure, palpitations, and severe cardiac disease.  Coricidin HBP is okay to use if you have high blood pressure.     Nasal Steroids  Nasal steroid medications such as Flonase are useful for upper respiratory  infections, allergies, and sensitivities to airborne irritants. Unfortunately, they do not begin to work for 1-2 days, and they do not reach their maximum benefit for approximately 2-3 weeks. Initial therapy is typically 2 puffs per nostril twice per day. This should be used for only a few days, then the maintenance dosage is one or two puffs per nostril once per day. This can be done at any time of the day. The most effective way to use any nasal medication is to look down at your toes when spraying it in. Aim slightly away from the septum (dividing plate between the nostrils), and gently inhale. This ensures that the spray will go into the sinus cavities and not straight up into the nose. A good way to avoid spraying onto the septum is to use the right hand to spray into the left nostril, and vice versa for the right nostril. Occasionally, nasal steroids can increase the risk of nosebleeds, but in general they are very well tolerated. If you develop a bloody nose, stop using the medication immediately.    Clear Runny Nose/Allergic Rhinitis:  Use an antihistamine to help dry you out.   Antihistamines  Antihistamines are available both over-the-counter and as a prescription. There are also various decongestant and antihistamine combinations available such as Claritin, Allegra, and Zyrtec. It is best to take any antihistamine-decongestant combination in the morning to avoid insomnia. Zyrtec should probably be taken at night, in order to reduce the chance of sleepiness during the daytime. If there is a significant infection present and secretions are already thickened, it is recommended to discontinue antihistamines and use a mucous thinner/decongestant combination.      Oral Steroids  Oral steroids can be used with more sever infections. Often, they are the only medications that will reduce the symptoms of pressure and allow the nasal sinuses to drain. These are best taken on a full stomach and earlier in the day is  better. They may give you some irritability, stomach upset, or hyperactivity. This can also interfere with sleep.     A person who has high blood pressure or diabetes should be very careful to monitor their blood pressure or blood glucose while taking steroids. Steroids can have multiple side effects especially when taken long-term. Short-term doses are usually very well tolerated and extremely effective in controlling the symptoms associated with acute and chronic sinus infections and severe allergies. The use of steroids for greater than approximately seven days requires a tapering down in order to discontinue them. You should not abruptly stop your steroid if you have been taking the same dose for greater than one week.    Antibiotic Treatment  Finally, when all of these other measures have failed, and a bacterial infection is present, an antibiotic will be prescribed. The most common symptoms of acute sinusitis of a bacterial nature are pain, pressure, and thick and colored nasal drainage. However, not all colored drainage means that there is a bacterial infection present. According to the Center for Disease Control, only 2% of colds will progress to result in bacterial sinusitis. Most upper respiratory infections should NOT be treated with antibiotics. Antibiotics should be reserved for upper respiratory infections which last longer than 10 days, or which worsen after 5 to 7 days of treatment. The use of antibiotics for nonbacterial upper respiratory infections has resulted in a severe problem with the emergence of bacteria which are resistant to multiple forms of antibiotics, and some bacteria are currently only treatable with intravenous antibiotics.    Body Aches/Pains/Fever  For patients who are not allergic to and are not on anticoagulants, you can alternate Tylenol every 4 hours and Motrin every 6 hours for fever above 100.4F and/or pain.  For patients who are allergic or intolerant to NSAIDS, have  gastritis, gastric ulcers, or history of GI bleeds, are pregnant, or are on anticoagulant therapy, you can take Tylenol every 4 hours as needed for fever above 100.4F and/or pain.     Maintain adequate hydration -  Rest and keep yourself/patient well hydrated. For adults, it is recommended to drink at least 8 glasses of water daily.  This may help thin secretions and soothe the respiratory mucosa.     Sore Throat  Perform warm, salt water gargles (1/2 tsp salt to 1 cup warm water) to help reduce inflammation and throat discomfort. Chloraseptic sprays and throat lozenges will also help with your throat pain.    COUGH  A viral cough may linger for 3 to 4 weeks but should steadily improve over time. Coughing is the body's natural way to clear mucus and help get rid of bacteria and viruses. Therefore, cough suppressants are usually not recommended.      Use mucinex (guaifenisin) up to 2400mg/day to break up/loosen any mucous.     Common cough suppressants include the ingredient dextromethorphan or DM, (such as Mucinex DM) available over-the-counter and can be used for cough to stop the tickle in the back of your throat.      ? Honey may be beneficial, especially on nocturnal cough 1 to 2 teaspoons can be taken straight or diluted in tea, juice or other liquid.    The antioxidants in honey are an important contributor to its decongestant properties. Generally speaking, darker honey contains more antioxidants. Buckwheat and avocado honey are particularly good choices. If these honeys are not available in your area, choose the darkest honey you can find.    Take all medications as directed. If you have been prescribed antibiotics, make sure to complete them.     If your condition fails to improve in a timely manner, you should receive another evaluation by your Primary Care Provider to discuss your concerns or return to urgent care for a recheck.      **You must understand that you have received Urgent Care treatment only  and that you may be released before all of your medical problems are known or treated. You, the patient, are responsible to arrange for follow-up care as instructed. If your condition worsens at any time, you should report immediately to your nearest Emergency Department for further evaluation.

## 2022-02-02 ENCOUNTER — PATIENT MESSAGE (OUTPATIENT)
Dept: OTHER | Facility: OTHER | Age: 57
End: 2022-02-02
Payer: MEDICAID

## 2022-02-04 PROBLEM — M25.531 RIGHT WRIST PAIN: Status: ACTIVE | Noted: 2022-02-04

## 2022-03-15 ENCOUNTER — PATIENT MESSAGE (OUTPATIENT)
Dept: OTHER | Facility: OTHER | Age: 57
End: 2022-03-15
Payer: MEDICAID

## 2022-04-30 ENCOUNTER — HOSPITAL ENCOUNTER (EMERGENCY)
Facility: HOSPITAL | Age: 57
Discharge: HOME | End: 2022-05-01
Attending: SURGERY
Payer: MEDICAID

## 2022-04-30 VITALS
HEART RATE: 90 BPM | RESPIRATION RATE: 18 BRPM | BODY MASS INDEX: 30.68 KG/M2 | SYSTOLIC BLOOD PRESSURE: 141 MMHG | WEIGHT: 220 LBS | TEMPERATURE: 97 F | OXYGEN SATURATION: 96 % | DIASTOLIC BLOOD PRESSURE: 96 MMHG

## 2022-04-30 DIAGNOSIS — S22.41XA CLOSED FRACTURE OF MULTIPLE RIBS OF RIGHT SIDE, INITIAL ENCOUNTER: Primary | ICD-10-CM

## 2022-04-30 PROCEDURE — 99285 EMERGENCY DEPT VISIT HI MDM: CPT | Mod: 25

## 2022-04-30 PROCEDURE — 63600175 PHARM REV CODE 636 W HCPCS: Performed by: SURGERY

## 2022-04-30 PROCEDURE — 96372 THER/PROPH/DIAG INJ SC/IM: CPT | Performed by: SURGERY

## 2022-04-30 RX ORDER — HYDROMORPHONE HYDROCHLORIDE 1 MG/ML
2 INJECTION, SOLUTION INTRAMUSCULAR; INTRAVENOUS; SUBCUTANEOUS
Status: COMPLETED | OUTPATIENT
Start: 2022-04-30 | End: 2022-04-30

## 2022-04-30 RX ORDER — ONDANSETRON 2 MG/ML
4 INJECTION INTRAMUSCULAR; INTRAVENOUS
Status: COMPLETED | OUTPATIENT
Start: 2022-04-30 | End: 2022-04-30

## 2022-04-30 RX ORDER — HYDROCODONE BITARTRATE AND ACETAMINOPHEN 10; 325 MG/1; MG/1
1 TABLET ORAL EVERY 4 HOURS PRN
Qty: 20 TABLET | Refills: 0 | OUTPATIENT
Start: 2022-04-30 | End: 2022-06-15

## 2022-04-30 RX ADMIN — HYDROMORPHONE HYDROCHLORIDE 2 MG: 1 INJECTION, SOLUTION INTRAMUSCULAR; INTRAVENOUS; SUBCUTANEOUS at 11:04

## 2022-04-30 RX ADMIN — ONDANSETRON HYDROCHLORIDE 4 MG: 2 SOLUTION INTRAMUSCULAR; INTRAVENOUS at 11:04

## 2022-05-01 NOTE — ED PROVIDER NOTES
Encounter Date: 4/30/2022       History     Chief Complaint   Patient presents with    Rib Injury     Patient to ER with pain to his right ribs, flank area from a fall a few days ago      Cristóbal Mackey is a 56 y.o. male presents with right rib pain today  Was seen at [place] emergency room 2 days ago with right rib pain  X-rays of the right ribs and right wrist showed no acute finding that day  All x-rays from that ER visit reviewed in the ER this evening by myself  Patient continues to have 6/10 throbbing sharp right posterior rib pain  Worse with deep breaths, worse with any activity on ER evaluation          Review of patient's allergies indicates:   Allergen Reactions    Ace inhibitors Other (See Comments)     cough    Penicillins Rash     Past Medical History:   Diagnosis Date    Abdominal wall seroma     ACE-inhibitor cough     Back pain     Borderline high cholesterol     Diabetes mellitus     Diverticulitis     GERD (gastroesophageal reflux disease)     Hyperlipidemia     Hypertension     Red blood cell antibody positive     indirect angelika positive    Rotator cuff tear     Wrist pain      Past Surgical History:   Procedure Laterality Date    APPLICATION OF WOUND VACUUM-ASSISTED CLOSURE DEVICE N/A 6/14/2019    Procedure: APPLICATION, WOUND VAC;  Surgeon: Luis Bogran-Reyes, MD;  Location: [facility];  Service: General;  Laterality: N/A;    ARTHROSCOPIC ACROMIOPLASTY OF SHOULDER Left 6/24/2021    Procedure: ACROMIOPLASTY, ARTHROSCOPIC;  Surgeon: Abdelrahman Valadez MD;  Location: [facility] OR;  Service: Orthopedics;  Laterality: Left;    ARTHROSCOPIC REPAIR OF ROTATOR CUFF OF SHOULDER Left 6/24/2021    Procedure: REPAIR, ROTATOR CUFF, ARTHROSCOPIC;  Surgeon: Abdelrahman Valadez MD;  Location: [facility] OR;  Service: Orthopedics;  Laterality: Left;    ARTHROSCOPY OF SHOULDER WITH DECOMPRESSION OF SUBACROMIAL SPACE Left 6/24/2021    Procedure: ARTHROSCOPY, SHOULDER, WITH SUBACROMIAL SPACE DECOMPRESSION;   Surgeon: Abdelrahman Valadez MD;  Location: [facility] OR;  Service: Orthopedics;  Laterality: Left;    COLONOSCOPY N/A 3/25/2019    Procedure: COLONOSCOPY;  Surgeon: Earl Webster MD;  Location: [facility] ENDO;  Service: Endoscopy;  Laterality: N/A;    COLOSTOMY N/A 2/8/2019    Procedure: CREATION, COLOSTOMY;  Surgeon: Luis Bogran-Reyes, MD;  Location: [facility] OR;  Service: General;  Laterality: N/A;  end      FOOT SURGERY Left     INCISION AND DRAINAGE N/A 6/14/2019    Procedure: INCISION AND DRAINAGE, HEMATOMA, seroma, OR FLUID COLLECTION;  Surgeon: Luis Bogran-Reyes, MD;  Location: [facility] OR;  Service: General;  Laterality: N/A;  seroma    L arm Left     LAPAROTOMY, EXPLORATORY N/A 2/8/2019    Procedure: LAPAROTOMY, EXPLORATORY;  Surgeon: Luis Bogran-Reyes, MD;  Location: [facility] OR;  Service: General;  Laterality: N/A;    LYSIS OF ADHESIONS N/A 4/30/2019    Procedure: LYSIS, ADHESIONS;  Surgeon: Luis Bogran-Reyes, MD;  Location: [facility] OR;  Service: General;  Laterality: N/A;    RECONSTRUCTION OF SHOULDER Right 6/24/2021    Procedure: RECONSTRUCTION, SHOULDER SUPERIOR CAPSULAR RECONSTRUCTION (ARTHROSCOPIC);  Surgeon: Abdelrahman Valadez MD;  Location: [facility] OR;  Service: Orthopedics;  Laterality: Right;    REVISION COLOSTOMY N/A 4/30/2019    Procedure: REVISION OR CLOSURE, COLOSTOMY;  Surgeon: Luis Bogran-Reyes, MD;  Location: [facility] OR;  Service: General;  Laterality: N/A;  reversal    SHOULDER ARTHROSCOPY Left 6/24/2021    Procedure: ARTHROSCOPY, SHOULDER;  Surgeon: Abdelrahman Valadez MD;  Location: [facility] OR;  Service: Orthopedics;  Laterality: Left;    SURGICAL REMOVAL OF SCAPHOID Right 2/4/2022    Procedure: EXCISION, SCAPHOID BONE;  Surgeon: Abdelrahman Valadez MD;  Location: [facility] OR;  Service: Orthopedics;  Laterality: Right;    Tailbone      Cyst removal     Family History   Problem Relation Age of Onset    Stroke Mother     Hypertension Mother     Kidney disease Mother     Aneurysm Father      Social History     Tobacco Use     Smoking status: Current Every Day Smoker     Packs/day: 1.00     Years: 36.00     Pack years: 36.00     Types: Cigarettes     Start date: 6/9/1984    Smokeless tobacco: Never Used   Substance Use Topics    Alcohol use: No     Alcohol/week: 0.0 standard drinks    Drug use: No     Review of Systems   Constitutional: Negative for activity change, appetite change, fatigue, fever and unexpected weight change.   HENT: Negative for congestion, ear pain, mouth sores, nosebleeds, rhinorrhea, sinus pressure, sneezing and sore throat.    Eyes: Negative for pain, discharge, redness and itching.   Respiratory: Negative for apnea, cough, chest tightness and shortness of breath.    Cardiovascular: Negative for chest pain, palpitations and leg swelling.   Gastrointestinal: Negative for abdominal distention, abdominal pain, anal bleeding, constipation, diarrhea, nausea and vomiting.   Endocrine: Negative.    Genitourinary: Negative for dysuria, enuresis, flank pain and frequency.   Musculoskeletal: Negative for arthralgias, back pain, neck pain and neck stiffness.        Right rib pain after fall 2 days ago   Skin: Negative for color change and wound.   Allergic/Immunologic: Negative.    Neurological: Negative for dizziness, tremors, syncope, facial asymmetry, speech difficulty, weakness, light-headedness, numbness and headaches.   Hematological: Negative for adenopathy. Does not bruise/bleed easily.   Psychiatric/Behavioral: Negative for agitation, behavioral problems, hallucinations, self-injury and suicidal ideas. The patient is not nervous/anxious.        Physical Exam     Initial Vitals [04/30/22 2215]   BP Pulse Resp Temp SpO2   (!) 141/96 90 18 97 °F (36.1 °C) 96 %      MAP       --         Physical Exam    Nursing note and vitals reviewed.  Constitutional: He appears well-developed and well-nourished.   HENT:   Head: Normocephalic and atraumatic.   Eyes: EOM are normal. Pupils are equal, round, and reactive to light.    Neck: Neck supple.   Normal range of motion.  Cardiovascular: Normal rate, regular rhythm, normal heart sounds and intact distal pulses.   Pulmonary/Chest: Breath sounds normal.   Abdominal: Abdomen is soft. Bowel sounds are normal.   Musculoskeletal:      Cervical back: Normal range of motion and neck supple.      Comments: Point tenderness in the right posterior lower ribs with no flail chest or deformity, no bruising     Neurological: He is alert and oriented to person, place, and time. He has normal strength.   Skin: Skin is warm and dry.         ED Course   Procedures  Labs Reviewed - No data to display       Imaging Results          CT Chest Without Contrast (Final result)  Result time 04/30/22 23:06:39    Final result by Ezekiel Fontanez MD (04/30/22 23:06:39)                 Impression:      Acute nondisplaced fractures of the posterolateral aspect of the right 10th and 11th ribs.  No evidence of a pneumothorax or pulmonary contusion.    Subsegmental atelectasis in the right lower lobe.    5 mm soft tissue density along the posterior aspect of the trachea.  Outpatient follow-up with pulmonary medicine is suggested.    Stable bilateral pulmonary nodules.      Electronically signed by: Ezekiel Fontanez MD  Date:    04/30/2022  Time:    23:06             Narrative:    EXAMINATION:  CT CHEST WITHOUT CONTRAST    CLINICAL HISTORY:  Chest trauma, blunt;    TECHNIQUE:  Low dose axial images, sagittal and coronal reformations were obtained from the thoracic inlet to the lung bases. Contrast was not administered.    COMPARISON:  CT scan of the chest dated 12/15/2021 and chest x-ray dated 04/28/2022.    FINDINGS:  The base of the neck is within normal limits.  The supraclavicular regions are unremarkable.    There is a 5 mm soft tissue along the posterior aspect of the distal trachea just above the level of the vicki.  The remainder of the trachea is within normal limits.  There is no evidence of bronchiectasis.    The  heart is unremarkable.  There are no pericardial effusions.  There are coronary artery calcifications.  The thoracic aorta is normal in caliber.  There is no evidence of intramural hematoma.    There are subcentimeter lymph nodes in the mediastinum.  There are calcified lymph nodes in the left hilum.  There also subcentimeter lymph nodes within the bilateral axillary regions.    There are no pleural effusions.  There is no evidence of a pneumothorax.  There is no evidence of pneumomediastinum.  There is stable 6 mm pulmonary nodule in the lingula.  There also stable calcified lymph nodes in the lingula.  There is a stable 8 mm pulmonary nodule in the right upper lobe.    There is subsegmental atelectasis in the right lower lobe.  There is no focal consolidation.  No airspace disease.    The esophagus is unremarkable.  There are simple cysts in both kidneys.  There is colonic diverticula without evidence of acute diverticulitis.  There is an umbilical hernia containing omental fat.  The remainder of the upper abdominal structures are within normal limits    The chest wall is unremarkable.  There are acute nondisplaced fractures involving the posterolateral aspect of the right 10th and 11th ribs.  The remainder of the osseous structures are unremarkable.                                 Medications   HYDROmorphone injection 2 mg (has no administration in time range)   ondansetron injection 4 mg (has no administration in time range)     Medical Decision Making:   Initial Assessment:   Right rib pain after slip and fall 2 days ago  Seen at another emergency room with negative right rib x-ray  Continued pain, denies any new injury on ER interview today    Differential Diagnosis:   Sprain, strain, fracture, dislocation, flail chest, pneumothorax, hemothorax    Clinical Tests:   Radiological Study: Ordered and Reviewed    ED Management:  This patient is posterior right 10th and 11th rib fractures on CT tonight  These were  not observed on the x-rays performed at [place] 2 days ago  Pain control in the ER with injection of Dilaudid, Rx of [place] on discharge  Conservative activity on discharge with moderate ambulation recommended  PCP follow-up in next 48 hours return with any concerns after discharge                       Clinical Impression:   Final diagnoses:  [S22.41XA] Closed fracture of multiple ribs of right side, initial encounter (Primary)          ED Disposition Condition    Discharge Stable        ED Prescriptions     Medication Sig Dispense Start Date End Date Auth. Provider    HYDROcodone-acetaminophen (NORCO)  mg per tablet Take 1 tablet by mouth every 4 (four) hours as needed for Pain (1 PO Q6 PRN for SEVERE PAIN.). 20 tablet 4/30/2022  Mainor Perry MD        Follow-up Information     Follow up With Specialties Details Why Contact Info    Shelli Price MD Internal Medicine Schedule an appointment as soon as possible for a visit in 2 days  [address] [facility] [address]  [phone]             Mainor Perry MD  04/30/22 1121

## 2022-05-17 ENCOUNTER — PATIENT MESSAGE (OUTPATIENT)
Dept: OTHER | Facility: OTHER | Age: 57
End: 2022-05-17
Payer: MEDICAID

## 2022-06-15 ENCOUNTER — HOSPITAL ENCOUNTER (EMERGENCY)
Facility: HOSPITAL | Age: 57
Discharge: HOME | End: 2022-06-15
Attending: SURGERY
Payer: MEDICAID

## 2022-06-15 VITALS
SYSTOLIC BLOOD PRESSURE: 148 MMHG | TEMPERATURE: 96 F | RESPIRATION RATE: 18 BRPM | WEIGHT: 213.31 LBS | HEART RATE: 86 BPM | OXYGEN SATURATION: 96 % | DIASTOLIC BLOOD PRESSURE: 78 MMHG | BODY MASS INDEX: 29.75 KG/M2

## 2022-06-15 DIAGNOSIS — R10.9 FLANK PAIN: Primary | ICD-10-CM

## 2022-06-15 LAB
ALBUMIN SERPL BCP-MCNC: 4.3 G/DL (ref 3.5–5.2)
ALP SERPL-CCNC: 100 U/L (ref 55–135)
ALT SERPL W/O P-5'-P-CCNC: 24 U/L (ref 10–44)
ANION GAP SERPL CALC-SCNC: 11 MMOL/L (ref 8–16)
AST SERPL-CCNC: 17 U/L (ref 10–40)
BASOPHILS # BLD AUTO: 0.12 K/UL (ref 0–0.2)
BASOPHILS NFR BLD: 1.2 % (ref 0–1.9)
BILIRUB SERPL-MCNC: 0.7 MG/DL (ref 0.1–1)
BILIRUB UR QL STRIP: NEGATIVE
BUN SERPL-MCNC: 16 MG/DL (ref 6–20)
CALCIUM SERPL-MCNC: 10 MG/DL (ref 8.7–10.5)
CHLORIDE SERPL-SCNC: 103 MMOL/L (ref 95–110)
CLARITY UR: CLEAR
CO2 SERPL-SCNC: 23 MMOL/L (ref 23–29)
COLOR UR: YELLOW
CREAT SERPL-MCNC: 0.7 MG/DL (ref 0.5–1.4)
DIFFERENTIAL METHOD: NORMAL
EOSINOPHIL # BLD AUTO: 0.4 K/UL (ref 0–0.5)
EOSINOPHIL NFR BLD: 3.9 % (ref 0–8)
ERYTHROCYTE [DISTWIDTH] IN BLOOD BY AUTOMATED COUNT: 14 % (ref 11.5–14.5)
EST. GFR  (AFRICAN AMERICAN): >60 ML/MIN/1.73 M^2
EST. GFR  (NON AFRICAN AMERICAN): >60 ML/MIN/1.73 M^2
GLUCOSE SERPL-MCNC: 142 MG/DL (ref 70–110)
GLUCOSE UR QL STRIP: ABNORMAL
HCT VFR BLD AUTO: 52.3 % (ref 40–54)
HGB BLD-MCNC: 17.3 G/DL (ref 14–18)
HGB UR QL STRIP: ABNORMAL
IMM GRANULOCYTES # BLD AUTO: 0.03 K/UL (ref 0–0.04)
IMM GRANULOCYTES NFR BLD AUTO: 0.3 % (ref 0–0.5)
KETONES UR QL STRIP: ABNORMAL
LEUKOCYTE ESTERASE UR QL STRIP: NEGATIVE
LIPASE SERPL-CCNC: 14 U/L (ref 4–60)
LYMPHOCYTES # BLD AUTO: 2 K/UL (ref 1–4.8)
LYMPHOCYTES NFR BLD: 19.9 % (ref 18–48)
MCH RBC QN AUTO: 29.1 PG (ref 27–31)
MCHC RBC AUTO-ENTMCNC: 33.1 G/DL (ref 32–36)
MCV RBC AUTO: 88 FL (ref 82–98)
MICROSCOPIC COMMENT: NORMAL
MONOCYTES # BLD AUTO: 0.6 K/UL (ref 0.3–1)
MONOCYTES NFR BLD: 6.3 % (ref 4–15)
NEUTROPHILS # BLD AUTO: 6.7 K/UL (ref 1.8–7.7)
NEUTROPHILS NFR BLD: 68.4 % (ref 38–73)
NITRITE UR QL STRIP: NEGATIVE
NRBC BLD-RTO: 0 /100 WBC
PH UR STRIP: 6 [PH] (ref 5–8)
PLATELET # BLD AUTO: 261 K/UL (ref 150–450)
PMV BLD AUTO: 10.8 FL (ref 9.2–12.9)
POTASSIUM SERPL-SCNC: 4.5 MMOL/L (ref 3.5–5.1)
PROT SERPL-MCNC: 7.8 G/DL (ref 6–8.4)
PROT UR QL STRIP: NEGATIVE
RBC # BLD AUTO: 5.95 M/UL (ref 4.6–6.2)
RBC #/AREA URNS HPF: 3 /HPF (ref 0–4)
SODIUM SERPL-SCNC: 137 MMOL/L (ref 136–145)
SP GR UR STRIP: 1.02 (ref 1–1.03)
URN SPEC COLLECT METH UR: ABNORMAL
UROBILINOGEN UR STRIP-ACNC: NEGATIVE EU/DL
WBC # BLD AUTO: 9.8 K/UL (ref 3.9–12.7)
WBC #/AREA URNS HPF: 2 /HPF (ref 0–5)

## 2022-06-15 PROCEDURE — 36415 COLL VENOUS BLD VENIPUNCTURE: CPT | Performed by: SURGERY

## 2022-06-15 PROCEDURE — 96372 THER/PROPH/DIAG INJ SC/IM: CPT | Performed by: SURGERY

## 2022-06-15 PROCEDURE — 63600175 PHARM REV CODE 636 W HCPCS: Performed by: SURGERY

## 2022-06-15 PROCEDURE — 83690 ASSAY OF LIPASE: CPT | Performed by: SURGERY

## 2022-06-15 PROCEDURE — 81000 URINALYSIS NONAUTO W/SCOPE: CPT | Performed by: SURGERY

## 2022-06-15 PROCEDURE — 99285 EMERGENCY DEPT VISIT HI MDM: CPT | Mod: 25

## 2022-06-15 PROCEDURE — 85025 COMPLETE CBC W/AUTO DIFF WBC: CPT | Performed by: SURGERY

## 2022-06-15 PROCEDURE — 80053 COMPREHEN METABOLIC PANEL: CPT | Performed by: SURGERY

## 2022-06-15 RX ORDER — KETOROLAC TROMETHAMINE 30 MG/ML
60 INJECTION, SOLUTION INTRAMUSCULAR; INTRAVENOUS
Status: COMPLETED | OUTPATIENT
Start: 2022-06-15 | End: 2022-06-15

## 2022-06-15 RX ORDER — HYDROCODONE BITARTRATE AND ACETAMINOPHEN 5; 325 MG/1; MG/1
1 TABLET ORAL EVERY 4 HOURS PRN
Qty: 15 TABLET | Refills: 0 | Status: SHIPPED | OUTPATIENT
Start: 2022-06-15 | End: 2022-06-17

## 2022-06-15 RX ORDER — CYCLOBENZAPRINE HCL 10 MG
10 TABLET ORAL 3 TIMES DAILY PRN
Qty: 10 TABLET | Refills: 0 | Status: SHIPPED | OUTPATIENT
Start: 2022-06-15 | End: 2022-06-20

## 2022-06-15 RX ADMIN — KETOROLAC TROMETHAMINE 60 MG: 30 INJECTION, SOLUTION INTRAMUSCULAR at 11:06

## 2022-06-15 NOTE — ED PROVIDER NOTES
Encounter Date: 6/15/2022       History     Chief Complaint   Patient presents with    Flank Pain     Right sided flank pain for 3 days     56-year-old male presents with right flank pain in the emergency room today  Patient with history of rib fractures on the right side earlier this year per history  Patient denies any new trauma, states he thinks he has a kidney stone today  Patient has long history of kidney stones but denies any hematuria this evening  5/10 right flank pain, worse with any movement or activity this week at home        Review of patient's allergies indicates:   Allergen Reactions    Ace inhibitors Other (See Comments)     cough    Penicillins Rash     Past Medical History:   Diagnosis Date    Abdominal wall seroma     ACE-inhibitor cough     Back pain     Borderline high cholesterol     Diabetes mellitus     Diverticulitis     GERD (gastroesophageal reflux disease)     Hyperlipidemia     Hypertension     Red blood cell antibody positive     indirect angelika positive    Rotator cuff tear     Wrist pain      Past Surgical History:   Procedure Laterality Date    APPLICATION OF WOUND VACUUM-ASSISTED CLOSURE DEVICE N/A 6/14/2019    Procedure: APPLICATION, WOUND VAC;  Surgeon: Luis Bogran-Reyes, MD;  Location: [facility];  Service: General;  Laterality: N/A;    ARTHROSCOPIC ACROMIOPLASTY OF SHOULDER Left 6/24/2021    Procedure: ACROMIOPLASTY, ARTHROSCOPIC;  Surgeon: Abdelrahman Valadez MD;  Location: [facility] OR;  Service: Orthopedics;  Laterality: Left;    ARTHROSCOPIC REPAIR OF ROTATOR CUFF OF SHOULDER Left 6/24/2021    Procedure: REPAIR, ROTATOR CUFF, ARTHROSCOPIC;  Surgeon: Abdelrahman Valadez MD;  Location: [facility] OR;  Service: Orthopedics;  Laterality: Left;    ARTHROSCOPY OF SHOULDER WITH DECOMPRESSION OF SUBACROMIAL SPACE Left 6/24/2021    Procedure: ARTHROSCOPY, SHOULDER, WITH SUBACROMIAL SPACE DECOMPRESSION;  Surgeon: Abdelrahman Valadez MD;  Location: [facility];  Service: Orthopedics;  Laterality:  Left;    COLONOSCOPY N/A 3/25/2019    Procedure: COLONOSCOPY;  Surgeon: Earl Webster MD;  Location: [facility] ENDO;  Service: Endoscopy;  Laterality: N/A;    COLOSTOMY N/A 2/8/2019    Procedure: CREATION, COLOSTOMY;  Surgeon: Luis Bogran-Reyes, MD;  Location: [facility] OR;  Service: General;  Laterality: N/A;  end      FOOT SURGERY Left     INCISION AND DRAINAGE N/A 6/14/2019    Procedure: INCISION AND DRAINAGE, HEMATOMA, seroma, OR FLUID COLLECTION;  Surgeon: Luis Bogran-Reyes, MD;  Location: [facility] OR;  Service: General;  Laterality: N/A;  seroma    L arm Left     LAPAROTOMY, EXPLORATORY N/A 2/8/2019    Procedure: LAPAROTOMY, EXPLORATORY;  Surgeon: Luis Bogran-Reyes, MD;  Location: [facility] OR;  Service: General;  Laterality: N/A;    LYSIS OF ADHESIONS N/A 4/30/2019    Procedure: LYSIS, ADHESIONS;  Surgeon: Luis Bogran-Reyes, MD;  Location: [facility] OR;  Service: General;  Laterality: N/A;    RECONSTRUCTION OF SHOULDER Right 6/24/2021    Procedure: RECONSTRUCTION, SHOULDER SUPERIOR CAPSULAR RECONSTRUCTION (ARTHROSCOPIC);  Surgeon: Abdelrahman Valadez MD;  Location: [facility] OR;  Service: Orthopedics;  Laterality: Right;    REVISION COLOSTOMY N/A 4/30/2019    Procedure: REVISION OR CLOSURE, COLOSTOMY;  Surgeon: Luis Bogran-Reyes, MD;  Location: [facility] OR;  Service: General;  Laterality: N/A;  reversal    SHOULDER ARTHROSCOPY Left 6/24/2021    Procedure: ARTHROSCOPY, SHOULDER;  Surgeon: Abdelrahman Valadez MD;  Location: [facility] OR;  Service: Orthopedics;  Laterality: Left;    SURGICAL REMOVAL OF SCAPHOID Right 2/4/2022    Procedure: EXCISION, SCAPHOID BONE;  Surgeon: Abdelrahman Valadez MD;  Location: [facility] OR;  Service: Orthopedics;  Laterality: Right;    Tailbone      Cyst removal     Family History   Problem Relation Age of Onset    Stroke Mother     Hypertension Mother     Kidney disease Mother     Aneurysm Father      Social History     Tobacco Use    Smoking status: Current Every Day Smoker     Packs/day: 1.00     Years: 36.00      Pack years: 36.00     Types: Cigarettes     Start date: 6/9/1984    Smokeless tobacco: Never Used   Substance Use Topics    Alcohol use: No     Alcohol/week: 0.0 standard drinks    Drug use: No     Review of Systems   Constitutional: Negative for activity change, appetite change, fatigue, fever and unexpected weight change.   HENT: Negative for congestion, ear pain, mouth sores, nosebleeds, rhinorrhea, sinus pressure, sneezing and sore throat.    Eyes: Negative for pain, discharge, redness and itching.   Respiratory: Negative for apnea, cough, chest tightness and shortness of breath.    Cardiovascular: Negative for chest pain, palpitations and leg swelling.   Gastrointestinal: Negative for abdominal distention, abdominal pain, anal bleeding, constipation, diarrhea, nausea and vomiting.   Endocrine: Negative.    Genitourinary: Positive for flank pain and hematuria. Negative for dysuria, enuresis and frequency.   Musculoskeletal: Negative for arthralgias, back pain, neck pain and neck stiffness.   Skin: Negative for color change and wound.   Allergic/Immunologic: Negative.    Neurological: Negative for dizziness, tremors, syncope, facial asymmetry, speech difficulty, weakness, light-headedness, numbness and headaches.   Hematological: Negative for adenopathy. Does not bruise/bleed easily.   Psychiatric/Behavioral: Negative for agitation, behavioral problems, hallucinations, self-injury and suicidal ideas. The patient is not nervous/anxious.        Physical Exam     Initial Vitals [06/15/22 0829]   BP Pulse Resp Temp SpO2   (!) 162/90 92 20 96.3 °F (35.7 °C) 96 %      MAP       --         Physical Exam    Constitutional: Vital signs are normal. He appears well-developed and well-nourished. He is cooperative.   HENT:   Head: Normocephalic and atraumatic.   Right Ear: Hearing, tympanic membrane, external ear and ear canal normal.   Left Ear: Hearing, tympanic membrane, external ear and ear canal normal.   Nose: Nose  normal.   Mouth/Throat: Uvula is midline, oropharynx is clear and moist and mucous membranes are normal.   Eyes: Conjunctivae, EOM and lids are normal. Pupils are equal, round, and reactive to light.   Neck: Neck supple. No JVD present.   Normal range of motion.   Full passive range of motion without pain.     Cardiovascular: Normal rate, regular rhythm, S1 normal, S2 normal, normal heart sounds, intact distal pulses and normal pulses.   Pulmonary/Chest: Effort normal and breath sounds normal.   Abdominal: Abdomen is soft and flat. Bowel sounds are normal.   Musculoskeletal:         General: Normal range of motion.      Cervical back: Full passive range of motion without pain, normal range of motion and neck supple.     Neurological: He is alert and oriented to person, place, and time. He has normal strength.   Skin: Skin is warm, dry and intact. Capillary refill takes less than 2 seconds.         ED Course   Procedures  Labs Reviewed   COMPREHENSIVE METABOLIC PANEL - Abnormal; Notable for the following components:       Result Value    Glucose 142 (*)     All other components within normal limits   URINALYSIS, REFLEX TO URINE CULTURE - Abnormal; Notable for the following components:    Glucose, UA 2+ (*)     Ketones, UA Trace (*)     Occult Blood UA 2+ (*)     All other components within normal limits    Narrative:     Specimen Source->Urine   CBC W/ AUTO DIFFERENTIAL   LIPASE   URINALYSIS MICROSCOPIC    Narrative:     Specimen Source->Urine          Imaging Results          CT Renal Stone Study ABD Pelvis WO (Final result)  Result time 06/15/22 10:52:30    Final result by Frandy Herman MD (06/15/22 10:52:30)                 Impression:      No CT evidence of an acute abnormality of the abdomen or pelvis.      Electronically signed by: Frandy Herman MD  Date:    06/15/2022  Time:    10:52             Narrative:    EXAMINATION:  CT RENAL STONE STUDY ABD PELVIS WO    CLINICAL HISTORY:  Flank pain, kidney  stone suspected;Nephrolithiasis, symptomatic/complicated;Pain lumbago (724.2);    TECHNIQUE:  Low dose axial images, sagittal and coronal reformations were obtained from the lung bases to the pubic symphysis.  Contrast was not administered.    COMPARISON:  Prior CT examinations of April 30, 2022 and September 29, 2020.    FINDINGS:  CT examination of the abdomen and pelvis without contrast dated Ainsley 15, 2022.    There are incompletely healed right 10th and 11th rib fractures.  No focal infiltrate or pleural effusion.    There are no  calcifications or evidence of obstructive uropathy.  There are stable bilateral renal cysts.  Stable adrenal hyperplasia.  No calcified gallstones.  No ductal dilatation.  Limited non contrast enhanced images of the solid abdominal viscera is otherwise unremarkable.    No focal abnormality of the aorta.  There are multiple small fat containing ventral hernias.  Prior sigmoid colon resection.  Diverticulosis without evidence of diverticulitis.  No bowel obstruction, pneumoperitoneum or significant free fluid.  No evidence of a focal inflammatory process.  The appendix is non distended.  Prostate calcifications.    Multilevel degenerative changes of the spine.                                 Medications   ketorolac injection 60 mg (60 mg Intramuscular Given 6/15/22 1117)     Medical Decision Making:   Initial Assessment:   Patient with right flank pain with a previous history kidney stone  Patient also had rib fractures earlier this year, does not think it is a rib fracture    Differential Diagnosis:   UTI, pyelonephritis, kidney stone, rib fracture    Clinical Tests:   Lab Tests: Ordered and Reviewed  Radiological Study: Ordered and Reviewed    ED Management:  Patient with right flank pain with a negative CT scan of the abdomen pelvis  No urinary tract infection, normal lab work, appears to be musculoskeletal  Pain medication helped in the ER with a small prescription on ER discharge    Pain controlled, counseled to follow-up and return as needed after discharge                      Clinical Impression:   Final diagnoses:  [R10.9] Flank pain (Primary)          ED Disposition Condition    Discharge Stable        ED Prescriptions     Medication Sig Dispense Start Date End Date Auth. Provider    HYDROcodone-acetaminophen (NORCO) 5-325 mg per tablet Take 1 tablet by mouth every 4 (four) hours as needed for Pain. 15 tablet 6/15/2022 6/17/2022 Mainor Perry MD    cyclobenzaprine (FLEXERIL) 10 MG tablet Take 1 tablet (10 mg total) by mouth 3 (three) times daily as needed for Muscle spasms. 10 tablet 6/15/2022 6/20/2022 Mainor Perry MD        Follow-up Information     Follow up With Specialties Details Why Contact Info    Shelli Price MD Internal Medicine Schedule an appointment as soon as possible for a visit in 2 days  [address] [facility] [address]  [phone]             Mainor Perry MD  06/15/22 1233

## 2022-06-23 PROBLEM — R06.83 SNORING: Status: ACTIVE | Noted: 2022-06-23

## 2022-07-12 ENCOUNTER — CLINICAL SUPPORT (OUTPATIENT)
Dept: URGENT CARE | Facility: CLINIC | Age: 57
End: 2022-07-12
Payer: MEDICAID

## 2022-07-12 DIAGNOSIS — Z20.822 ENCOUNTER FOR LABORATORY TESTING FOR COVID-19 VIRUS: Primary | ICD-10-CM

## 2022-07-12 LAB
CTP QC/QA: YES
SARS-COV-2 RDRP RESP QL NAA+PROBE: NEGATIVE

## 2022-07-12 PROCEDURE — U0002 COVID-19 LAB TEST NON-CDC: HCPCS | Mod: QW,S$GLB,, | Performed by: PHYSICIAN ASSISTANT

## 2022-07-12 PROCEDURE — U0002: ICD-10-PCS | Mod: QW,S$GLB,, | Performed by: PHYSICIAN ASSISTANT

## 2022-07-12 NOTE — LETTER
July 12, 2022      [facility]  [address], [address]  [place] [address]  Phone: [phone]  Fax: [phone]       Patient: Cristóbal Mackey   YOB: 1965  Date of Visit: 07/12/2022    To Whom It May Concern:    Nely Mackey  was at [facility] on 07/12/2022. The patient was tested NEGATIVE for COVID-19. If you have any questions or concerns, or if I can be of further assistance, please do not hesitate to contact me.    Sincerely,    Leigh Ann Kowalski MA

## 2022-07-12 NOTE — PROGRESS NOTES

## 2022-07-14 ENCOUNTER — HOSPITAL ENCOUNTER (OUTPATIENT)
Dept: SLEEP MEDICINE | Facility: HOSPITAL | Age: 57
Discharge: HOME | End: 2022-07-14
Attending: NURSE PRACTITIONER
Payer: MEDICAID

## 2022-07-14 DIAGNOSIS — G47.33 OBSTRUCTIVE SLEEP APNEA (ADULT) (PEDIATRIC): ICD-10-CM

## 2022-07-14 PROCEDURE — 95810 POLYSOM 6/> YRS 4/> PARAM: CPT

## 2022-08-12 PROBLEM — G47.33 MILD OBSTRUCTIVE SLEEP APNEA: Status: ACTIVE | Noted: 2022-06-23

## 2022-09-08 ENCOUNTER — HOSPITAL ENCOUNTER (EMERGENCY)
Facility: HOSPITAL | Age: 57
Discharge: HOME | End: 2022-09-08
Attending: SURGERY
Payer: MEDICAID

## 2022-09-08 VITALS
DIASTOLIC BLOOD PRESSURE: 78 MMHG | HEART RATE: 85 BPM | OXYGEN SATURATION: 95 % | HEIGHT: 71 IN | TEMPERATURE: 97 F | RESPIRATION RATE: 18 BRPM | SYSTOLIC BLOOD PRESSURE: 118 MMHG | BODY MASS INDEX: 30.34 KG/M2 | WEIGHT: 216.69 LBS

## 2022-09-08 DIAGNOSIS — J06.9 VIRAL URI WITH COUGH: Primary | ICD-10-CM

## 2022-09-08 LAB
GROUP A STREP, MOLECULAR: NEGATIVE
INFLUENZA A, MOLECULAR: NEGATIVE
INFLUENZA B, MOLECULAR: NEGATIVE
SARS-COV-2 RDRP RESP QL NAA+PROBE: NEGATIVE
SPECIMEN SOURCE: NORMAL

## 2022-09-08 PROCEDURE — U0002 COVID-19 LAB TEST NON-CDC: HCPCS | Performed by: SURGERY

## 2022-09-08 PROCEDURE — 87651 STREP A DNA AMP PROBE: CPT | Performed by: STUDENT IN AN ORGANIZED HEALTH CARE EDUCATION/TRAINING PROGRAM

## 2022-09-08 PROCEDURE — 99284 EMERGENCY DEPT VISIT MOD MDM: CPT | Mod: 25

## 2022-09-08 PROCEDURE — 87502 INFLUENZA DNA AMP PROBE: CPT | Performed by: SURGERY

## 2022-09-08 RX ORDER — BENZONATATE 100 MG/1
100 CAPSULE ORAL 3 TIMES DAILY PRN
Qty: 20 CAPSULE | Refills: 0 | Status: SHIPPED | OUTPATIENT
Start: 2022-09-08 | End: 2022-09-18

## 2022-09-08 RX ORDER — FLUTICASONE PROPIONATE 50 MCG
1 SPRAY, SUSPENSION (ML) NASAL 2 TIMES DAILY PRN
Qty: 15 G | Refills: 0 | Status: SHIPPED | OUTPATIENT
Start: 2022-09-08

## 2022-09-08 NOTE — ED NOTES
MD REVIEWED RESULTS AND DC INSTRUCTIONS WITH PT, PT VERBALIZED UNDERSTANDING. AVS PRINTED AND GIVEN. REFER TO MD NOTES FOR PT ASSESSMENT

## 2022-09-27 PROBLEM — S63.8X1A TEAR OF RIGHT SCAPHOLUNATE LIGAMENT: Status: ACTIVE | Noted: 2022-09-27

## 2022-10-13 PROBLEM — G47.34 SLEEP RELATED HYPOXIA: Status: ACTIVE | Noted: 2021-06-24

## 2022-10-13 PROBLEM — J44.9 STAGE 2 MODERATE COPD BY GOLD CLASSIFICATION: Status: ACTIVE | Noted: 2022-10-13

## 2022-10-21 PROBLEM — D49.0 TUMOR OF PAROTID GLAND: Status: ACTIVE | Noted: 2022-10-21

## 2022-11-03 ENCOUNTER — PATIENT MESSAGE (OUTPATIENT)
Dept: OTHER | Facility: OTHER | Age: 57
End: 2022-11-03
Payer: MEDICAID

## 2023-04-26 ENCOUNTER — PATIENT OUTREACH (OUTPATIENT)
Dept: ADMINISTRATIVE | Facility: HOSPITAL | Age: 58
End: 2023-04-26
Payer: MEDICAID

## 2023-05-26 LAB
LEFT EYE DM RETINOPATHY: NEGATIVE
RIGHT EYE DM RETINOPATHY: NEGATIVE

## 2023-06-12 ENCOUNTER — PATIENT OUTREACH (OUTPATIENT)
Dept: ADMINISTRATIVE | Facility: HOSPITAL | Age: 58
End: 2023-06-12
Payer: MEDICAID

## 2023-08-23 ENCOUNTER — PATIENT OUTREACH (OUTPATIENT)
Dept: ADMINISTRATIVE | Facility: HOSPITAL | Age: 58
End: 2023-08-23
Payer: MEDICAID

## 2023-08-25 DIAGNOSIS — I10 ESSENTIAL HYPERTENSION: ICD-10-CM

## 2023-08-26 RX ORDER — CARVEDILOL 25 MG/1
25 TABLET ORAL 2 TIMES DAILY
Qty: 180 TABLET | Refills: 3 | Status: SHIPPED | OUTPATIENT
Start: 2023-08-26 | End: 2023-11-28

## 2024-05-15 PROBLEM — F10.20 ALCOHOL USE DISORDER, SEVERE, DEPENDENCE: Status: ACTIVE | Noted: 2024-05-15

## 2024-05-16 ENCOUNTER — PATIENT MESSAGE (OUTPATIENT)
Dept: ADMINISTRATIVE | Facility: OTHER | Age: 59
End: 2024-05-16
Payer: MEDICAID

## 2024-05-28 PROBLEM — Z87.442 HISTORY OF NEPHROLITHIASIS: Status: ACTIVE | Noted: 2024-05-28

## 2024-05-28 PROBLEM — R63.4 WEIGHT LOSS: Status: ACTIVE | Noted: 2024-05-28

## 2024-05-30 PROBLEM — N28.1 BILATERAL RENAL CYSTS: Status: ACTIVE | Noted: 2024-05-30

## 2024-08-04 PROBLEM — M67.911 DYSFUNCTION OF RIGHT ROTATOR CUFF: Status: ACTIVE | Noted: 2024-08-04

## 2024-08-05 LAB
LEFT EYE DM RETINOPATHY: NEGATIVE
RIGHT EYE DM RETINOPATHY: NEGATIVE

## 2024-08-08 ENCOUNTER — PATIENT MESSAGE (OUTPATIENT)
Dept: ADMINISTRATIVE | Facility: HOSPITAL | Age: 59
End: 2024-08-08
Payer: MEDICAID

## 2024-08-21 ENCOUNTER — PATIENT OUTREACH (OUTPATIENT)
Dept: ADMINISTRATIVE | Facility: HOSPITAL | Age: 59
End: 2024-08-21
Payer: MEDICAID

## 2024-09-09 ENCOUNTER — PATIENT MESSAGE (OUTPATIENT)
Dept: ADMINISTRATIVE | Facility: OTHER | Age: 59
End: 2024-09-09
Payer: MEDICAID

## 2024-09-10 PROBLEM — M67.921 TENDINOPATHY OF RIGHT BICEPS TENDON: Status: ACTIVE | Noted: 2024-09-10

## 2024-09-10 PROBLEM — M75.121 NONTRAUMATIC COMPLETE TEAR OF RIGHT ROTATOR CUFF: Status: ACTIVE | Noted: 2024-09-10

## 2024-10-11 ENCOUNTER — PATIENT MESSAGE (OUTPATIENT)
Dept: ADMINISTRATIVE | Facility: HOSPITAL | Age: 59
End: 2024-10-11
Payer: MEDICAID

## 2025-05-12 ENCOUNTER — HOSPITAL ENCOUNTER (EMERGENCY)
Facility: HOSPITAL | Age: 60
Discharge: HOME | End: 2025-05-12
Attending: SURGERY
Payer: MEDICARE

## 2025-05-12 VITALS
SYSTOLIC BLOOD PRESSURE: 134 MMHG | OXYGEN SATURATION: 95 % | HEIGHT: 71 IN | BODY MASS INDEX: 26.09 KG/M2 | WEIGHT: 186.38 LBS | HEART RATE: 81 BPM | TEMPERATURE: 98 F | DIASTOLIC BLOOD PRESSURE: 82 MMHG | RESPIRATION RATE: 14 BRPM

## 2025-05-12 DIAGNOSIS — R10.30 LOWER ABDOMINAL PAIN: ICD-10-CM

## 2025-05-12 DIAGNOSIS — K52.9 GASTROENTERITIS: ICD-10-CM

## 2025-05-12 DIAGNOSIS — I72.3 ILIAC ARTERY ANEURYSM: Primary | ICD-10-CM

## 2025-05-12 DIAGNOSIS — E87.6 HYPOKALEMIA: ICD-10-CM

## 2025-05-12 LAB
ABSOLUTE EOSINOPHIL (OHS): 0.12 K/UL
ABSOLUTE MONOCYTE (OHS): 0.78 K/UL (ref 0.3–1)
ABSOLUTE NEUTROPHIL COUNT (OHS): 6.49 K/UL (ref 1.8–7.7)
ALBUMIN SERPL BCP-MCNC: 3.9 G/DL (ref 3.5–5.2)
ALP SERPL-CCNC: 105 UNIT/L (ref 40–150)
ALT SERPL W/O P-5'-P-CCNC: 20 UNIT/L (ref 10–44)
ANION GAP (OHS): 10 MMOL/L (ref 8–16)
AST SERPL-CCNC: 15 UNIT/L (ref 11–45)
BACTERIA #/AREA URNS HPF: NORMAL /HPF
BASOPHILS # BLD AUTO: 0.07 K/UL
BASOPHILS NFR BLD AUTO: 0.7 %
BILIRUB SERPL-MCNC: 0.5 MG/DL (ref 0.1–1)
BILIRUB UR QL STRIP.AUTO: NEGATIVE
BUN SERPL-MCNC: 12 MG/DL (ref 6–20)
CALCIUM SERPL-MCNC: 9.7 MG/DL (ref 8.7–10.5)
CHLORIDE SERPL-SCNC: 105 MMOL/L (ref 95–110)
CLARITY UR: CLEAR
CO2 SERPL-SCNC: 22 MMOL/L (ref 23–29)
COLOR UR AUTO: YELLOW
CREAT SERPL-MCNC: 0.7 MG/DL (ref 0.5–1.4)
ERYTHROCYTE [DISTWIDTH] IN BLOOD BY AUTOMATED COUNT: 14.5 % (ref 11.5–14.5)
GFR SERPLBLD CREATININE-BSD FMLA CKD-EPI: >60 ML/MIN/1.73/M2
GLUCOSE SERPL-MCNC: 144 MG/DL (ref 70–110)
GLUCOSE UR QL STRIP: ABNORMAL
HCT VFR BLD AUTO: 48.8 % (ref 40–54)
HGB BLD-MCNC: 16.5 GM/DL (ref 14–18)
HGB UR QL STRIP: ABNORMAL
HOLD SPECIMEN: NORMAL
HOLD SPECIMEN: NORMAL
HYALINE CASTS #/AREA URNS LPF: 0 /LPF (ref 0–1)
IMM GRANULOCYTES # BLD AUTO: 0.02 K/UL (ref 0–0.04)
IMM GRANULOCYTES NFR BLD AUTO: 0.2 % (ref 0–0.5)
KETONES UR QL STRIP: NEGATIVE
LACTATE SERPL-SCNC: 1.3 MMOL/L (ref 0.5–2.2)
LEUKOCYTE ESTERASE UR QL STRIP: NEGATIVE
LIPASE SERPL-CCNC: 25 U/L (ref 4–60)
LYMPHOCYTES # BLD AUTO: 2.19 K/UL (ref 1–4.8)
MAGNESIUM SERPL-MCNC: 1.7 MG/DL (ref 1.6–2.6)
MCH RBC QN AUTO: 29.2 PG (ref 27–31)
MCHC RBC AUTO-ENTMCNC: 33.8 G/DL (ref 32–36)
MCV RBC AUTO: 86 FL (ref 82–98)
MICROSCOPIC COMMENT: NORMAL
NITRITE UR QL STRIP: NEGATIVE
NUCLEATED RBC (/100WBC) (OHS): 0 /100 WBC
PH UR STRIP: 6 [PH]
PLATELET # BLD AUTO: 262 K/UL (ref 150–450)
PMV BLD AUTO: 10.2 FL (ref 9.2–12.9)
POTASSIUM SERPL-SCNC: 2.9 MMOL/L (ref 3.5–5.1)
PROT SERPL-MCNC: 7.5 GM/DL (ref 6–8.4)
PROT UR QL STRIP: ABNORMAL
RBC # BLD AUTO: 5.66 M/UL (ref 4.6–6.2)
RBC #/AREA URNS HPF: 4 /HPF (ref 0–4)
RELATIVE EOSINOPHIL (OHS): 1.2 %
RELATIVE LYMPHOCYTE (OHS): 22.6 % (ref 18–48)
RELATIVE MONOCYTE (OHS): 8.1 % (ref 4–15)
RELATIVE NEUTROPHIL (OHS): 67.2 % (ref 38–73)
SODIUM SERPL-SCNC: 137 MMOL/L (ref 136–145)
SP GR UR STRIP: 1.01
SQUAMOUS #/AREA URNS HPF: 1 /HPF
UROBILINOGEN UR STRIP-ACNC: ABNORMAL EU/DL
WBC # BLD AUTO: 9.67 K/UL (ref 3.9–12.7)
WBC #/AREA URNS HPF: 3 /HPF (ref 0–5)
YEAST URNS QL MICRO: NORMAL /HPF

## 2025-05-12 PROCEDURE — 85025 COMPLETE CBC W/AUTO DIFF WBC: CPT | Performed by: NURSE PRACTITIONER

## 2025-05-12 PROCEDURE — 83735 ASSAY OF MAGNESIUM: CPT | Performed by: NURSE PRACTITIONER

## 2025-05-12 PROCEDURE — 96375 TX/PRO/DX INJ NEW DRUG ADDON: CPT

## 2025-05-12 PROCEDURE — 96374 THER/PROPH/DIAG INJ IV PUSH: CPT

## 2025-05-12 PROCEDURE — 83605 ASSAY OF LACTIC ACID: CPT | Performed by: NURSE PRACTITIONER

## 2025-05-12 PROCEDURE — 83690 ASSAY OF LIPASE: CPT | Performed by: NURSE PRACTITIONER

## 2025-05-12 PROCEDURE — 63600175 PHARM REV CODE 636 W HCPCS: Performed by: NURSE PRACTITIONER

## 2025-05-12 PROCEDURE — 80053 COMPREHEN METABOLIC PANEL: CPT | Performed by: NURSE PRACTITIONER

## 2025-05-12 PROCEDURE — 25500020 PHARM REV CODE 255: Performed by: NURSE PRACTITIONER

## 2025-05-12 PROCEDURE — 25000003 PHARM REV CODE 250: Performed by: STUDENT IN AN ORGANIZED HEALTH CARE EDUCATION/TRAINING PROGRAM

## 2025-05-12 PROCEDURE — 99285 EMERGENCY DEPT VISIT HI MDM: CPT | Mod: 25

## 2025-05-12 PROCEDURE — 96361 HYDRATE IV INFUSION ADD-ON: CPT

## 2025-05-12 PROCEDURE — 81001 URINALYSIS AUTO W/SCOPE: CPT | Performed by: NURSE PRACTITIONER

## 2025-05-12 PROCEDURE — 25000003 PHARM REV CODE 250: Performed by: NURSE PRACTITIONER

## 2025-05-12 RX ORDER — ONDANSETRON HYDROCHLORIDE 2 MG/ML
4 INJECTION, SOLUTION INTRAVENOUS
Status: COMPLETED | OUTPATIENT
Start: 2025-05-12 | End: 2025-05-12

## 2025-05-12 RX ORDER — HYOSCYAMINE SULFATE 0.125 MG
125 TABLET ORAL EVERY 4 HOURS PRN
Qty: 20 TABLET | Refills: 0 | Status: SHIPPED | OUTPATIENT
Start: 2025-05-12

## 2025-05-12 RX ORDER — ONDANSETRON 4 MG/1
4 TABLET, FILM COATED ORAL EVERY 6 HOURS
Qty: 12 TABLET | Refills: 0 | Status: SHIPPED | OUTPATIENT
Start: 2025-05-12

## 2025-05-12 RX ORDER — MORPHINE SULFATE 2 MG/ML
4 INJECTION, SOLUTION INTRAMUSCULAR; INTRAVENOUS
Refills: 0 | Status: COMPLETED | OUTPATIENT
Start: 2025-05-12 | End: 2025-05-12

## 2025-05-12 RX ORDER — KETOROLAC TROMETHAMINE 30 MG/ML
15 INJECTION, SOLUTION INTRAMUSCULAR; INTRAVENOUS
Status: COMPLETED | OUTPATIENT
Start: 2025-05-12 | End: 2025-05-12

## 2025-05-12 RX ORDER — SODIUM CHLORIDE 9 MG/ML
500 INJECTION, SOLUTION INTRAVENOUS
Status: COMPLETED | OUTPATIENT
Start: 2025-05-12 | End: 2025-05-12

## 2025-05-12 RX ADMIN — IOHEXOL 100 ML: 350 INJECTION, SOLUTION INTRAVENOUS at 08:05

## 2025-05-12 RX ADMIN — POTASSIUM BICARBONATE 20 MEQ: 391 TABLET, EFFERVESCENT ORAL at 09:05

## 2025-05-12 RX ADMIN — SODIUM CHLORIDE 500 ML: 9 INJECTION, SOLUTION INTRAVENOUS at 08:05

## 2025-05-12 RX ADMIN — KETOROLAC TROMETHAMINE 15 MG: 30 INJECTION, SOLUTION INTRAMUSCULAR; INTRAVENOUS at 07:05

## 2025-05-12 RX ADMIN — ONDANSETRON 4 MG: 2 INJECTION INTRAMUSCULAR; INTRAVENOUS at 08:05

## 2025-05-12 RX ADMIN — MORPHINE SULFATE 4 MG: 2 INJECTION, SOLUTION INTRAMUSCULAR; INTRAVENOUS at 08:05

## 2025-05-13 NOTE — ED PROVIDER NOTES
Encounter Date: 5/12/2025       History     Chief Complaint   Patient presents with    Abdominal Pain     Pt to ED with c/o lower abdominal pain and nausea that began Saturday.      Cristóbal Mackey is a 59 y.o. male with PMH of DM, HTN, Hyperlipidemia, GERD presenting to the ED for evaluation of abdominal pain. Patient presents with a 2 day ho lower abdominal pain and nausea. Pain is located mainly in the center of his lower abdomen and is worse with standing and movement. Pain is relieved when sitting. He currently rates pain 6/10 in severity. He reports associated nausea. Denies vomiting. He denies loose stool. Denies fever or UTI symptoms.  + hx of colostomy s/t diverticulitis.     The history is provided by the patient.     Review of patient's allergies indicates:   Allergen Reactions    Ace inhibitors Other (See Comments)     cough    Penicillins Rash     Past Medical History:   Diagnosis Date    Abdominal wall seroma     ACE-inhibitor cough     Back pain     Borderline high cholesterol     Diabetes mellitus     Diverticulitis     GERD (gastroesophageal reflux disease)     Hyperlipidemia     Hypertension     Red blood cell antibody positive     indirect angelika positive    Rotator cuff tear     Unspecified rotator cuff tear or rupture of unspecified shoulder, not specified as traumatic     Wrist pain      Past Surgical History:   Procedure Laterality Date    APPLICATION OF WOUND VACUUM-ASSISTED CLOSURE DEVICE N/A 6/14/2019    Procedure: APPLICATION, WOUND VAC;  Surgeon: Luis Bogran-Reyes, MD;  Location: [facility];  Service: General;  Laterality: N/A;    ARTHROSCOPIC ACROMIOPLASTY OF SHOULDER Left 6/24/2021    Procedure: ACROMIOPLASTY, ARTHROSCOPIC;  Surgeon: Abdelrahman Valadez MD;  Location: [facility] OR;  Service: Orthopedics;  Laterality: Left;    ARTHROSCOPIC REPAIR OF ROTATOR CUFF OF SHOULDER Left 6/24/2021    Procedure: REPAIR, ROTATOR CUFF, ARTHROSCOPIC;  Surgeon: Abdelrahman Valadez MD;  Location: [facility] OR;  Service:  Orthopedics;  Laterality: Left;    ARTHROSCOPIC REPAIR OF ROTATOR CUFF OF SHOULDER Right 12/5/2024    Procedure: REPAIR, ROTATOR CUFF, ARTHROSCOPIC;  Surgeon: Abdelrahman Valaedz MD;  Location: [facility] OR;  Service: Orthopedics;  Laterality: Right;  labral debridement    ARTHROSCOPY OF SHOULDER WITH DECOMPRESSION OF SUBACROMIAL SPACE Left 6/24/2021    Procedure: ARTHROSCOPY, SHOULDER, WITH SUBACROMIAL SPACE DECOMPRESSION;  Surgeon: Abdelrahman Valadez MD;  Location: [facility] OR;  Service: Orthopedics;  Laterality: Left;    ARTHROSCOPY OF SHOULDER WITH DECOMPRESSION OF SUBACROMIAL SPACE Right 12/5/2024    Procedure: ARTHROSCOPY, SHOULDER, WITH SUBACROMIAL SPACE DECOMPRESSION;  Surgeon: Abdelrahman Valadez MD;  Location: [facility] OR;  Service: Orthopedics;  Laterality: Right;    COLONOSCOPY N/A 3/25/2019    Procedure: COLONOSCOPY;  Surgeon: Earl Webster MD;  Location: [facility] ENDO;  Service: Endoscopy;  Laterality: N/A;    COLOSTOMY N/A 2/8/2019    Procedure: CREATION, COLOSTOMY;  Surgeon: Luis Bogran-Reyes, MD;  Location: [facility] OR;  Service: General;  Laterality: N/A;  end      EXCISION OF PAROTID GLAND Left 10/25/2022    Procedure: EXCISION, PAROTID GLAND;  Surgeon: Rojelio Pace MD;  Location: [facility];  Service: Oral Surgery;  Laterality: Left;    FOOT SURGERY Left     HARDWARE REMOVAL Right 9/27/2022    Procedure: REMOVAL, HARDWARE;  Surgeon: Abdelrahman Valadez MD;  Location: [facility];  Service: Orthopedics;  Laterality: Right;    INCISION AND DRAINAGE N/A 6/14/2019    Procedure: INCISION AND DRAINAGE, HEMATOMA, seroma, OR FLUID COLLECTION;  Surgeon: Luis Bogran-Reyes, MD;  Location: [facility] OR;  Service: General;  Laterality: N/A;  seroma    L arm Left     LAPAROTOMY, EXPLORATORY N/A 2/8/2019    Procedure: LAPAROTOMY, EXPLORATORY;  Surgeon: Luis Bogran-Reyes, MD;  Location: [facility] OR;  Service: General;  Laterality: N/A;    LYSIS OF ADHESIONS N/A 4/30/2019    Procedure: LYSIS, ADHESIONS;  Surgeon: Luis Bogran-Reyes, MD;  Location: [facility];   Service: General;  Laterality: N/A;    RECONSTRUCTION OF SHOULDER Right 6/24/2021    Procedure: RECONSTRUCTION, SHOULDER SUPERIOR CAPSULAR RECONSTRUCTION (ARTHROSCOPIC);  Surgeon: Abdelrahman Valadez MD;  Location: [facility];  Service: Orthopedics;  Laterality: Right;    REVISION COLOSTOMY N/A 4/30/2019    Procedure: REVISION OR CLOSURE, COLOSTOMY;  Surgeon: Luis Bogran-Reyes, MD;  Location: [facility];  Service: General;  Laterality: N/A;  reversal    SHOULDER ARTHROSCOPY Left 6/24/2021    Procedure: ARTHROSCOPY, SHOULDER;  Surgeon: Abdelrahman Valadez MD;  Location: [facility] OR;  Service: Orthopedics;  Laterality: Left;    SURGICAL REMOVAL OF SCAPHOID Right 2/4/2022    Procedure: EXCISION, SCAPHOID BONE;  Surgeon: Abdelrahman Valadez MD;  Location: [facility];  Service: Orthopedics;  Laterality: Right;    Tailbone      Cyst removal     Family History   Problem Relation Name Age of Onset    Stroke Mother      Hypertension Mother      Kidney disease Mother      Aneurysm Father       Social History[1]  Review of Systems   Constitutional:  Negative for appetite change, chills and fever.   HENT:  Negative for congestion, ear discharge, ear pain, postnasal drip, rhinorrhea and sore throat.    Respiratory:  Negative for cough, chest tightness and shortness of breath.    Cardiovascular:  Negative for chest pain.   Gastrointestinal:  Positive for abdominal pain and nausea. Negative for abdominal distention.   Genitourinary:  Negative for dysuria, flank pain, hematuria and urgency.   Musculoskeletal:  Negative for arthralgias and back pain.   Skin: Negative.  Negative for rash.   Neurological:  Negative for dizziness, weakness, numbness and headaches.   Hematological:  Does not bruise/bleed easily.       Physical Exam     Initial Vitals [05/12/25 1908]   BP Pulse Resp Temp SpO2   (!) 139/90 107 20 98.3 °F (36.8 °C) 96 %      MAP       --         Physical Exam    Nursing note and vitals reviewed.  Constitutional: He appears well-developed and  well-nourished.   HENT:   Head: Normocephalic and atraumatic.   Eyes: Conjunctivae and EOM are normal. Pupils are equal, round, and reactive to light.   Neck: Neck supple.   Cardiovascular:  Normal rate, regular rhythm, normal heart sounds and intact distal pulses.           Pulmonary/Chest: Breath sounds normal.   Abdominal: Abdomen is soft. Bowel sounds are normal. There is abdominal tenderness in the suprapubic area.   Musculoskeletal:         General: Normal range of motion.      Cervical back: Neck supple.     Neurological: He is alert and oriented to person, place, and time. He has normal strength.   Skin: Skin is warm and dry.   Psychiatric: He has a normal mood and affect. His behavior is normal. Judgment and thought content normal.         ED Course   Procedures  Labs Reviewed   COMPREHENSIVE METABOLIC PANEL - Abnormal       Result Value    Sodium 137      Potassium 2.9 (*)     Chloride 105      CO2 22 (*)     Glucose 144 (*)     BUN 12      Creatinine 0.7      Calcium 9.7      Protein Total 7.5      Albumin 3.9      Bilirubin Total 0.5            AST 15      ALT 20      Anion Gap 10      eGFR >60     URINALYSIS, REFLEX TO URINE CULTURE - Abnormal    Color, UA Yellow      Appearance, UA Clear      pH, UA 6.0      Spec Grav UA 1.015      Protein, UA 1+ (*)     Glucose, UA 3+ (*)     Ketones, UA Negative      Bilirubin, UA Negative      Blood, UA 2+ (*)     Nitrites, UA Negative      Urobilinogen, UA 4.0-6.0 (*)     Leukocyte Esterase, UA Negative     LIPASE - Normal    Lipase Level 25     LACTIC ACID, PLASMA - Normal    Lactic Acid Level 1.3      Narrative:     Falsely low lactic acid results can be found in samples containing >=13.0 mg/dL total bilirubin and/or >=3.5 mg/dL direct bilirubin.    CBC WITH DIFFERENTIAL - Normal    WBC 9.67      RBC 5.66      HGB 16.5      HCT 48.8      MCV 86      MCH 29.2      MCHC 33.8      RDW 14.5      Platelet Count 262      MPV 10.2      Nucleated RBC 0      Neut %  67.2      Lymph % 22.6      Mono % 8.1      Eos % 1.2      Basophil % 0.7      Imm Grans % 0.2      Neut # 6.49      Lymph # 2.19      Mono # 0.78      Eos # 0.12      Baso # 0.07      Imm Grans # 0.02     MAGNESIUM - Normal    Magnesium  1.7     CBC W/ AUTO DIFFERENTIAL    Narrative:     The following orders were created for panel order CBC W/ AUTO DIFFERENTIAL.  Procedure                               Abnormality         Status                     ---------                               -----------         ------                     CBC with Differential[[id number]]       Normal              Final result                 Please view results for these tests on the individual orders.   GREY TOP URINE HOLD    Extra Tube Hold for add-ons.     URINALYSIS MICROSCOPIC    RBC, UA 4      WBC, UA 3      Bacteria, UA None      Yeast, UA None      Squamous Epithelial Cells, UA 1      Hyaline Casts, UA 0      Microscopic Comment       EXTRA TUBES    Narrative:     The following orders were created for panel order EXTRA TUBES.  Procedure                               Abnormality         Status                     ---------                               -----------         ------                     Light Blue Top Hold[[id number]]                             Final result                 Please view results for these tests on the individual orders.   LIGHT BLUE TOP HOLD    Extra Tube Hold for add-ons.            Imaging Results              CT Abdomen Pelvis With IV Contrast NO Oral Contrast (In process)  Result time 05/12/25 21:02:28                     Medications   potassium bicarbonate disintegrating tablet 20 mEq (has no administration in time range)   ketorolac injection 15 mg (15 mg Intravenous Given 5/12/25 1935)   0.9% NaCl infusion (500 mLs Intravenous New Bag 5/12/25 2025)   morphine injection 4 mg (4 mg Intravenous Given 5/12/25 2024)   ondansetron injection 4 mg (4 mg Intravenous Given 5/12/25 2024)   iohexoL  (OMNIPAQUE 350) injection 100 mL (100 mLs Intravenous Given 5/12/25 2049)     Medical Decision Making  Evaluation of a 59-year-old male presenting with lower abdominal pain for the past 2 days  Presents afebrile, nontoxic appearing with stable vital signs  Physical exam with suprapubic and left lower quadrant abdominal tenderness with guarding  Active bowel sounds in all 4 quadrant    Differential diagnosis includes diverticulitis, UTI, kidney stone, colitis, SBO    Amount and/or Complexity of Data Reviewed  Labs: ordered.  Radiology: ordered. Decision-making details documented in ED Course.    Risk  Prescription drug management.  Risk Details: Lab workup with no leukocytosis.  CMP with a potassium of 2.9.  Urinalysis with 2+ blood.  Negative lactic acid.  CT abdomen pelvis pending    Care of patient transitioned to Dr. Shaver at shift change                                       Clinical Impression:  Final diagnoses:  [R10.30] Lower abdominal pain (Primary)  [E87.6] Hypokalemia                     [1]   Social History  Tobacco Use    Smoking status: Every Day     Current packs/day: 0.25     Average packs/day: 0.3 packs/day for 40.9 years (10.2 ttl pk-yrs)     Types: Cigarettes     Start date: 6/9/1984    Smokeless tobacco: Never   Substance Use Topics    Alcohol use: Not Currently     Comment: OCCASSIONALLY    Drug use: No        Doris Corona NP  05/12/25 2101

## 2025-05-15 PROBLEM — I72.3 ANEURYSM OF LEFT COMMON ILIAC ARTERY: Status: ACTIVE | Noted: 2025-05-15

## 2025-05-16 ENCOUNTER — PATIENT MESSAGE (OUTPATIENT)
Dept: ADMINISTRATIVE | Facility: OTHER | Age: 60
End: 2025-05-16
Payer: MEDICARE